# Patient Record
Sex: MALE | Race: BLACK OR AFRICAN AMERICAN | Employment: OTHER | URBAN - METROPOLITAN AREA
[De-identification: names, ages, dates, MRNs, and addresses within clinical notes are randomized per-mention and may not be internally consistent; named-entity substitution may affect disease eponyms.]

---

## 2020-01-22 ENCOUNTER — HOSPITAL ENCOUNTER (OUTPATIENT)
Dept: LAB | Age: 65
Discharge: HOME | End: 2020-01-22

## 2020-01-22 ENCOUNTER — OFFICE VISIT (OUTPATIENT)
Dept: FAMILY MEDICINE CLINIC | Age: 65
End: 2020-01-22

## 2020-01-22 VITALS
OXYGEN SATURATION: 96 % | HEIGHT: 69 IN | SYSTOLIC BLOOD PRESSURE: 140 MMHG | TEMPERATURE: 98 F | RESPIRATION RATE: 16 BRPM | DIASTOLIC BLOOD PRESSURE: 94 MMHG | HEART RATE: 75 BPM | BODY MASS INDEX: 29.47 KG/M2 | WEIGHT: 199 LBS

## 2020-01-22 DIAGNOSIS — I10 ESSENTIAL HYPERTENSION: Primary | ICD-10-CM

## 2020-01-22 DIAGNOSIS — E78.2 MIXED HYPERLIPIDEMIA: ICD-10-CM

## 2020-01-22 DIAGNOSIS — Z11.59 SCREENING FOR VIRAL DISEASE: ICD-10-CM

## 2020-01-22 DIAGNOSIS — I10 ESSENTIAL HYPERTENSION: ICD-10-CM

## 2020-01-22 LAB
ALBUMIN SERPL-MCNC: 4.1 G/DL (ref 3.5–5)
ALBUMIN/GLOB SERPL: 0.9 {RATIO} (ref 1.1–2.2)
ALP SERPL-CCNC: 81 U/L (ref 45–117)
ALT SERPL-CCNC: 29 U/L (ref 12–78)
ANION GAP SERPL CALC-SCNC: 7 MMOL/L (ref 5–15)
AST SERPL-CCNC: 22 U/L (ref 15–37)
BILIRUB SERPL-MCNC: 0.7 MG/DL (ref 0.2–1)
BUN SERPL-MCNC: 9 MG/DL (ref 6–20)
BUN/CREAT SERPL: 9 (ref 12–20)
CALCIUM SERPL-MCNC: 9.3 MG/DL (ref 8.5–10.1)
CHLORIDE SERPL-SCNC: 104 MMOL/L (ref 97–108)
CHOLEST SERPL-MCNC: 158 MG/DL
CO2 SERPL-SCNC: 27 MMOL/L (ref 21–32)
CREAT SERPL-MCNC: 1 MG/DL (ref 0.7–1.3)
GLOBULIN SER CALC-MCNC: 4.5 G/DL (ref 2–4)
GLUCOSE SERPL-MCNC: 106 MG/DL (ref 65–100)
HDLC SERPL-MCNC: 40 MG/DL
HDLC SERPL: 4 {RATIO} (ref 0–5)
LDLC SERPL CALC-MCNC: 88.2 MG/DL (ref 0–100)
LIPID PROFILE,FLP: NORMAL
POTASSIUM SERPL-SCNC: 3.2 MMOL/L (ref 3.5–5.1)
PROT SERPL-MCNC: 8.6 G/DL (ref 6.4–8.2)
SODIUM SERPL-SCNC: 138 MMOL/L (ref 136–145)
TRIGL SERPL-MCNC: 149 MG/DL (ref ?–150)
VLDLC SERPL CALC-MCNC: 29.8 MG/DL

## 2020-01-22 RX ORDER — ATORVASTATIN CALCIUM 10 MG/1
TABLET, FILM COATED ORAL DAILY
COMMUNITY
End: 2020-01-22 | Stop reason: SDUPTHER

## 2020-01-22 RX ORDER — AMLODIPINE BESYLATE 10 MG/1
TABLET ORAL DAILY
COMMUNITY
End: 2020-01-22 | Stop reason: SDUPTHER

## 2020-01-22 RX ORDER — LISINOPRIL 40 MG/1
40 TABLET ORAL DAILY
Qty: 90 TAB | Refills: 1 | Status: SHIPPED | OUTPATIENT
Start: 2020-01-22 | End: 2020-08-11

## 2020-01-22 RX ORDER — POTASSIUM CHLORIDE 20 MEQ/1
TABLET, EXTENDED RELEASE ORAL 2 TIMES DAILY
COMMUNITY
End: 2020-03-03

## 2020-01-22 RX ORDER — AMLODIPINE BESYLATE 10 MG/1
10 TABLET ORAL DAILY
Qty: 90 TAB | Refills: 1 | Status: SHIPPED | OUTPATIENT
Start: 2020-01-22 | End: 2020-08-26

## 2020-01-22 RX ORDER — ATORVASTATIN CALCIUM 10 MG/1
10 TABLET, FILM COATED ORAL DAILY
Qty: 90 TAB | Refills: 1 | Status: SHIPPED | OUTPATIENT
Start: 2020-01-22 | End: 2020-07-22

## 2020-01-22 NOTE — PROGRESS NOTES
Иван Zurita is a 71 y.o. male   Chief Complaint   Patient presents with    New Patient    Establish Care    pt here to [facility] and states he has missed a few days of BP medication. Pt does need a refill of meds and has hx of HLD and is on lipitor 10mg in the morning. Pt is fasting today and will start the lipitor at night. Pt does not check bp at home. No issues with myalgias or arthralgias. he is a 71y.o. year old male who presents for evalution. Reviewed PmHx, RxHx, FmHx, SocHx, AllgHx and updated and dated in the chart. Review of Systems - negative except as listed above in the HPI    Objective:     Vitals:    01/22/20 0922 01/22/20 0955   BP: (!) 155/104 (!) 140/94   Pulse: 75    Resp: 16    Temp: 98 °F (36.7 °C)    TempSrc: Oral    SpO2: 96%    Weight: 199 lb (90.3 kg)    Height: 5' 9\" (1.753 m)        Current Outpatient Medications   Medication Sig    potassium chloride (K-DUR, KLOR-CON) 20 mEq tablet Take  by mouth two (2) times a day.  amLODIPine (NORVASC) 10 mg tablet Take 1 Tab by mouth daily.  atorvastatin (LIPITOR) 10 mg tablet Take 1 Tab by mouth daily.  lisinopril (PRINIVIL, ZESTRIL) 40 mg tablet Take 1 Tab by mouth daily.  TRIAMTERENE-HYDROCHLOROTHIAZIDE 37.5 MG-25 MG TAB       No current facility-administered medications for this visit. Physical Examination: General appearance - alert, well appearing, and in no distress  Chest - clear to auscultation, no wheezes, rales or rhonchi, symmetric air entry  Heart - normal rate, regular rhythm, normal S1, S2, no murmurs, rubs, clicks or gallops  Abdomen - soft, nontender, nondistended, no masses or organomegaly      Assessment/ Plan:   Diagnoses and all orders for this visit:    1. Essential hypertension  -     amLODIPine (NORVASC) 10 mg tablet; Take 1 Tab by mouth daily. -     lisinopril (PRINIVIL, ZESTRIL) 40 mg tablet; Take 1 Tab by mouth daily.  -     METABOLIC PANEL, COMPREHENSIVE; Future    2.  Mixed hyperlipidemia  -     atorvastatin (LIPITOR) 10 mg tablet; Take 1 Tab by mouth daily.  -     LIPID PANEL; Future  -     METABOLIC PANEL, COMPREHENSIVE; Future    3. Screening for viral disease  -     HEPATITIS C AB; Future       Follow-up and Dispositions    · Return in about 3 months (around 4/22/2020), or if symptoms worsen or fail to improve. I have discussed the diagnosis with the patient and the intended plan as seen in the above orders. The patient has received an after-visit summary and questions were answered concerning future plans. Pt conveyed understanding of plan.     Medication Side Effects and Warnings were discussed with patient      Alla Recinos DO

## 2020-01-22 NOTE — PATIENT INSTRUCTIONS
A Healthy Lifestyle: Care Instructions Your Care Instructions A healthy lifestyle can help you feel good, stay at a healthy weight, and have plenty of energy for both work and play. A healthy lifestyle is something you can share with your whole family. A healthy lifestyle also can lower your risk for serious health problems, such as high blood pressure, heart disease, and diabetes. You can follow a few steps listed below to improve your health and the health of your family. Follow-up care is a key part of your treatment and safety. Be sure to make and go to all appointments, and call your doctor if you are having problems. It's also a good idea to know your test results and keep a list of the medicines you take. How can you care for yourself at home? · Do not eat too much sugar, fat, or fast foods. You can still have dessert and treats now and then. The goal is moderation. · Start small to improve your eating habits. Pay attention to portion sizes, drink less juice and soda pop, and eat more fruits and vegetables. ? Eat a healthy amount of food. A 3-ounce serving of meat, for example, is about the size of a deck of cards. Fill the rest of your plate with vegetables and whole grains. ? Limit the amount of soda and sports drinks you have every day. Drink more water when you are thirsty. ? Eat at least 5 servings of fruits and vegetables every day. It may seem like a lot, but it is not hard to reach this goal. A serving or helping is 1 piece of fruit, 1 cup of vegetables, or 2 cups of leafy, raw vegetables. Have an apple or some carrot sticks as an afternoon snack instead of a candy bar. Try to have fruits and/or vegetables at every meal. 
· Make exercise part of your daily routine. You may want to start with simple activities, such as walking, bicycling, or slow swimming. Try to be active 30 to 60 minutes every day.  You do not need to do all 30 to 60 minutes all at once. For example, you can exercise 3 times a day for 10 or 20 minutes. Moderate exercise is safe for most people, but it is always a good idea to talk to your doctor before starting an exercise program. 
· Keep moving. Nae Pierre the lawn, work in the garden, or [employer]. Take the stairs instead of the elevator at work. · If you smoke, quit. People who smoke have an increased risk for heart attack, stroke, cancer, and other lung illnesses. Quitting is hard, but there are ways to boost your chance of quitting tobacco for good. ? Use nicotine gum, patches, or lozenges. ? Ask your doctor about stop-smoking programs and medicines. ? Keep trying. In addition to reducing your risk of diseases in the future, you will notice some benefits soon after you stop using tobacco. If you have shortness of breath or asthma symptoms, they will likely get better within a few weeks after you quit. · Limit how much alcohol you drink. Moderate amounts of alcohol (up to 2 drinks a day for men, 1 drink a day for women) are okay. But drinking too much can lead to liver problems, high blood pressure, and other health problems. Family health If you have a family, there are many things you can do together to improve your health. · Eat meals together as a family as often as possible. · Eat healthy foods. This includes fruits, vegetables, lean meats and dairy, and whole grains. · Include your family in your fitness plan. Most people think of activities such as jogging or tennis as the way to fitness, but there are many ways you and your family can be more active. Anything that makes you breathe hard and gets your heart pumping is exercise. Here are some tips: 
? Walk to do errands or to take your child to school or the bus. 
? Go for a family bike ride after dinner instead of watching TV. Where can you learn more? Go to http://justine-natacha.info/. Enter D621 in the search box to learn more about \"A Healthy Lifestyle: Care Instructions. \" Current as of: May 28, 2019 Content Version: 12.2 © 5711-9023 [employer]. Care instructions adapted under license by [employer] (which disclaims liability or warranty for this information). If you have questions about a medical condition or this instruction, always ask your healthcare professional. [place] [address] any warranty or liability for your use of this information.

## 2020-01-22 NOTE — PROGRESS NOTES
Chief Complaint   Patient presents with    New Patient   [address]     Patient presents in office today as a NP to establish care. No concerns.     Learning Assessment 1/22/2020   PRIMARY LEARNER Patient   PRIMARY LANGUAGE ENGLISH   LEARNER PREFERENCE PRIMARY LISTENING   ANSWERED BY self   RELATIONSHIP SELF

## 2020-01-23 LAB
HCV AB SERPL QL IA: NONREACTIVE
HCV COMMENT,HCGAC: NORMAL

## 2020-01-23 NOTE — PROGRESS NOTES
Unable to add on an A1C he can stop in for another draw no appt needed. Or we can check at next visit.

## 2020-01-23 NOTE — PROGRESS NOTES
Glucose is a touch high so adding on a diabetes marker. Potassium is a little low so increase dietary potassium thru legumes, mushrooms, yogurt/dairy. Hep C negative    Cholesterol looks good.

## 2020-01-24 ENCOUNTER — TELEPHONE (OUTPATIENT)
Dept: FAMILY MEDICINE CLINIC | Age: 65
End: 2020-01-24

## 2020-01-24 NOTE — TELEPHONE ENCOUNTER
----- Message from Johnathon Plummer sent at 1/24/2020 10:20 AM EST -----  Regarding: Anai/Telephone  Patient return call    Caller's first and last name and relationship (if not the patient):      Best contact number(s): [phone]      Whose call is being returned: Karlyne Kanner      Details to clarify the request:please call patient back      Johnathon Plummer

## 2020-01-24 NOTE — PROGRESS NOTES
2nd attempt. Called and spoke with patient in reference to labs. Verbalized understanding. F/u apt scheduled for 4/22/20. He states that he will do his A1C at that time.

## 2020-03-03 RX ORDER — POTASSIUM CHLORIDE 1500 MG/1
TABLET, EXTENDED RELEASE ORAL
Qty: 30 TAB | Refills: 0 | Status: SHIPPED | OUTPATIENT
Start: 2020-03-03 | End: 2020-03-31

## 2020-03-31 RX ORDER — POTASSIUM CHLORIDE 1500 MG/1
TABLET, EXTENDED RELEASE ORAL
Qty: 30 TAB | Refills: 0 | Status: SHIPPED | OUTPATIENT
Start: 2020-03-31 | End: 2020-04-29

## 2020-04-29 RX ORDER — POTASSIUM CHLORIDE 1500 MG/1
TABLET, EXTENDED RELEASE ORAL
Qty: 30 TAB | Refills: 0 | Status: SHIPPED | OUTPATIENT
Start: 2020-04-29 | End: 2020-05-28

## 2020-05-28 RX ORDER — POTASSIUM CHLORIDE 1500 MG/1
TABLET, EXTENDED RELEASE ORAL
Qty: 30 TAB | Refills: 0 | Status: SHIPPED | OUTPATIENT
Start: 2020-05-28 | End: 2020-06-30

## 2020-06-30 RX ORDER — POTASSIUM CHLORIDE 1500 MG/1
TABLET, EXTENDED RELEASE ORAL
Qty: 30 TAB | Refills: 0 | Status: SHIPPED | OUTPATIENT
Start: 2020-06-30 | End: 2020-07-31

## 2020-07-20 DIAGNOSIS — E78.2 MIXED HYPERLIPIDEMIA: ICD-10-CM

## 2020-07-22 RX ORDER — ATORVASTATIN CALCIUM 10 MG/1
TABLET, FILM COATED ORAL
Qty: 90 TAB | Refills: 1 | Status: SHIPPED | OUTPATIENT
Start: 2020-07-22 | End: 2021-02-02

## 2020-07-31 RX ORDER — POTASSIUM CHLORIDE 1500 MG/1
TABLET, EXTENDED RELEASE ORAL
Qty: 30 TAB | Refills: 0 | Status: SHIPPED | OUTPATIENT
Start: 2020-07-31 | End: 2020-08-18 | Stop reason: SDUPTHER

## 2020-07-31 NOTE — LETTER
8/4/2020 8:40 AM 
 
Mr. Concecpion Chatman [address] [place] [address] Dear Mr. Ricouel Alu: 
 
Isai Foley missed you! We have sent a 30 day supply of your potassium supplement to pharmacy but we do need to have labs drawn,please call our office at [phone] and schedule a follow up appointment for your continued care before the next refill. Sincerely, [address] [facility] [address], DO

## 2020-08-03 NOTE — TELEPHONE ENCOUNTER
Attempt to reach pt unsuccessful. LVM for patient to [facility] to make appt for future refills.

## 2020-08-04 ENCOUNTER — TELEPHONE (OUTPATIENT)
Dept: FAMILY MEDICINE CLINIC | Age: 65
End: 2020-08-04

## 2020-08-08 DIAGNOSIS — I10 ESSENTIAL HYPERTENSION: ICD-10-CM

## 2020-08-11 RX ORDER — LISINOPRIL 40 MG/1
TABLET ORAL
Qty: 90 TAB | Refills: 1 | Status: ON HOLD | OUTPATIENT
Start: 2020-08-11 | End: 2021-02-03

## 2020-08-18 ENCOUNTER — OFFICE VISIT (OUTPATIENT)
Dept: FAMILY MEDICINE CLINIC | Age: 65
End: 2020-08-18
Payer: MEDICARE

## 2020-08-18 VITALS
RESPIRATION RATE: 18 BRPM | SYSTOLIC BLOOD PRESSURE: 152 MMHG | WEIGHT: 196 LBS | HEART RATE: 71 BPM | OXYGEN SATURATION: 97 % | BODY MASS INDEX: 29.03 KG/M2 | TEMPERATURE: 98 F | HEIGHT: 69 IN | DIASTOLIC BLOOD PRESSURE: 99 MMHG

## 2020-08-18 DIAGNOSIS — R73.01 ELEVATED FASTING GLUCOSE: ICD-10-CM

## 2020-08-18 DIAGNOSIS — I10 ESSENTIAL HYPERTENSION: ICD-10-CM

## 2020-08-18 DIAGNOSIS — Z12.11 COLON CANCER SCREENING: ICD-10-CM

## 2020-08-18 DIAGNOSIS — Z13.6 SCREENING FOR CARDIOVASCULAR CONDITION: ICD-10-CM

## 2020-08-18 DIAGNOSIS — Z12.5 SPECIAL SCREENING FOR MALIGNANT NEOPLASM OF PROSTATE: ICD-10-CM

## 2020-08-18 DIAGNOSIS — E78.2 MIXED HYPERLIPIDEMIA: Primary | ICD-10-CM

## 2020-08-18 DIAGNOSIS — Z00.00 MEDICARE ANNUAL WELLNESS VISIT, SUBSEQUENT: ICD-10-CM

## 2020-08-18 PROCEDURE — G0439 PPPS, SUBSEQ VISIT: HCPCS | Performed by: FAMILY MEDICINE

## 2020-08-18 PROCEDURE — G8419 CALC BMI OUT NRM PARAM NOF/U: HCPCS | Performed by: FAMILY MEDICINE

## 2020-08-18 PROCEDURE — G8536 NO DOC ELDER MAL SCRN: HCPCS | Performed by: FAMILY MEDICINE

## 2020-08-18 PROCEDURE — 99214 OFFICE O/P EST MOD 30 MIN: CPT | Performed by: FAMILY MEDICINE

## 2020-08-18 PROCEDURE — 1101F PT FALLS ASSESS-DOCD LE1/YR: CPT | Performed by: FAMILY MEDICINE

## 2020-08-18 PROCEDURE — G8510 SCR DEP NEG, NO PLAN REQD: HCPCS | Performed by: FAMILY MEDICINE

## 2020-08-18 PROCEDURE — G8427 DOCREV CUR MEDS BY ELIG CLIN: HCPCS | Performed by: FAMILY MEDICINE

## 2020-08-18 PROCEDURE — G8753 SYS BP > OR = 140: HCPCS | Performed by: FAMILY MEDICINE

## 2020-08-18 PROCEDURE — G8755 DIAS BP > OR = 90: HCPCS | Performed by: FAMILY MEDICINE

## 2020-08-18 PROCEDURE — G0444 DEPRESSION SCREEN ANNUAL: HCPCS | Performed by: FAMILY MEDICINE

## 2020-08-18 PROCEDURE — 3017F COLORECTAL CA SCREEN DOC REV: CPT | Performed by: FAMILY MEDICINE

## 2020-08-18 RX ORDER — POTASSIUM CHLORIDE 20 MEQ/1
TABLET, EXTENDED RELEASE ORAL
Qty: 30 TAB | Refills: 11 | Status: SHIPPED | OUTPATIENT
Start: 2020-08-18 | End: 2021-10-26

## 2020-08-18 NOTE — PROGRESS NOTES
Progress Note    he is a 76y.o. year old male who presents for evalution. Subjective:     Pt here for follow up on lipitor and tolerates this fine. Last LDL was at goal, no hx of CVD. Pt on lisinopril 40 and took that this AM.  Pt is fasting. Prev glucose was a little elevated at 106. Denies any hx of prediabetes or DM. Pt does exercise states walks 1 mile per day. Pt is down 3 lbs from last visit and attributes this to walking. Follows no specific diet. Patient is a smoker and is 76years old has never had a AAA screen will order. Reviewed PmHx, RxHx, FmHx, SocHx, AllgHx and updated and dated in the chart. Review of Systems - negative except as listed above in the HPI    Objective:     Vitals:    08/18/20 0829 08/18/20 0913   BP: 143/90 (!) 152/99   Pulse: 71    Resp: 18    Temp: 98 °F (36.7 °C)    TempSrc: Oral    SpO2: 97%    Weight: 196 lb (88.9 kg)    Height: 5' 9\" (1.753 m)        Current Outpatient Medications   Medication Sig    potassium chloride (Klor-Con M20) 20 mEq tablet TAKE 1 TABLET BY MOUTH EVERY DAY    lisinopriL (PRINIVIL, ZESTRIL) 40 mg tablet TAKE 1 TABLET BY MOUTH EVERY DAY    atorvastatin (LIPITOR) 10 mg tablet TAKE 1 TABLET BY MOUTH EVERY DAY    amLODIPine (NORVASC) 10 mg tablet Take 1 Tab by mouth daily.  TRIAMTERENE-HYDROCHLOROTHIAZIDE 37.5 MG-25 MG TAB       No current facility-administered medications for this visit. Physical Examination: General appearance - alert, well appearing, and in no distress  Mental status - alert, oriented to person, place, and time  Chest - clear to auscultation, no wheezes, rales or rhonchi, symmetric air entry  Heart - normal rate, regular rhythm, normal S1, S2, no murmurs, rubs, clicks or gallops  Extremities - peripheral pulses normal, no pedal edema, no clubbing or cyanosis      Assessment/ Plan:   Diagnoses and all orders for this visit:    1.  Mixed hyperlipidemia  -     LIPID PANEL  -     METABOLIC PANEL, COMPREHENSIVE    2. Medicare annual wellness visit, subsequent  -     CBC WITH AUTOMATED DIFF    3. Special screening for malignant neoplasm of prostate  -     PSA SCREENING (SCREENING)    4. Essential hypertension  -     METABOLIC PANEL, COMPREHENSIVE  Patient advised to follow-up in 4 weeks for blood pressure recheck. Lifestyle changes  5. Elevated fasting glucose  -     HEMOGLOBIN A1C WITH EAG    6. Colon cancer screening  -     COLOGUARD TEST (FECAL DNA COLORECTAL CANCER SCREENING)    7. Screening for cardiovascular condition  -     US EXAM SCREENING AAA; Future    Other orders  -     potassium chloride (Klor-Con M20) 20 mEq tablet; TAKE 1 TABLET BY MOUTH EVERY DAY       Follow-up and Dispositions    · Return in about 4 weeks (around 9/15/2020), or if symptoms worsen or fail to improve, for Blood pressure check. I have discussed the diagnosis with the patient and the intended plan as seen in the above orders. The patient has received an after-visit summary and questions were answered concerning future plans. Pt conveyed understanding of plan. Medication Side Effects and Warnings were discussed with patient      Ronni Kapoor, DO        This is an Initial Medicare Annual Wellness Exam (AWV) (Performed 12 months after IPPE or effective date of Medicare Part B enrollment, Once in a lifetime)    I have reviewed the patient's medical history in detail and updated the computerized patient record. History     Patient Active Problem List   Diagnosis Code    HTN (hypertension) I10     Past Medical History:   Diagnosis Date    Essential hypertension     Hypercholesterolemia       History reviewed. No pertinent surgical history.   Current Outpatient Medications   Medication Sig Dispense Refill    potassium chloride (Klor-Con M20) 20 mEq tablet TAKE 1 TABLET BY MOUTH EVERY DAY 30 Tab 11    lisinopriL (PRINIVIL, ZESTRIL) 40 mg tablet TAKE 1 TABLET BY MOUTH EVERY DAY 90 Tab 1    atorvastatin (LIPITOR) 10 mg tablet TAKE 1 TABLET BY MOUTH EVERY DAY 90 Tab 1    amLODIPine (NORVASC) 10 mg tablet Take 1 Tab by mouth daily. 90 Tab 1    TRIAMTERENE-HYDROCHLOROTHIAZIDE 37.5 MG-25 MG TAB         No Known Allergies    Family History   Problem Relation Age of Onset    Diabetes Mother      Social History     Tobacco Use    Smoking status: Current Every Day Smoker     Packs/day: 0.25     Years: 30.00     Pack years: 7.50     Types: Cigarettes    Smokeless tobacco: Never Used   Substance Use Topics    Alcohol use: Yes     Comment: socially        Depression Risk Factor Screening:     3 most recent PHQ Screens 8/18/2020   Little interest or pleasure in doing things Not at all   Feeling down, depressed, irritable, or hopeless Not at all   Total Score PHQ 2 0       Alcohol Risk Factor Screening (MALE > 65): Do you average more 1 drink per night or more than 7 drinks a week: No    In the past three months have you have had more than 4 drinks containing alcohol on one occasion: No      Functional Ability and Level of Safety:   Hearing: Hearing is good. Activities of Daily Living: The home contains: no safety equipment. Patient does total self care    Ambulation: with no difficulty      Fall Risk:  Fall Risk Assessment, last 12 mths 8/18/2020   Able to walk? Yes   Fall in past 12 months? No     Abuse Screen:  Patient is not abused       Cognitive Screening   Has your family/caregiver stated any concerns about your memory: no         Patient Care Team   Patient Care Team:  Lauro Hernandez DO as PCP - General (Family Medicine)  Lauro Hernandez DO as PCP - [facility] Empaneled Provider    Assessment/Plan   Education and counseling provided:  Are appropriate based on today's review and evaluation  Prostate cancer screening tests (PSA, covered annually)    Diagnoses and all orders for this visit:    1. Mixed hyperlipidemia  -     LIPID PANEL  -     METABOLIC PANEL, COMPREHENSIVE    2.  Medicare annual wellness visit, subsequent  - CBC WITH AUTOMATED DIFF    3. Special screening for malignant neoplasm of prostate  -     PSA SCREENING (SCREENING)    4. Essential hypertension  -     METABOLIC PANEL, COMPREHENSIVE    5. Elevated fasting glucose  -     HEMOGLOBIN A1C WITH EAG    6. Colon cancer screening  -     COLOGUARD TEST (FECAL DNA COLORECTAL CANCER SCREENING)    7. Screening for cardiovascular condition  -     US EXAM SCREENING AAA; Future    Other orders  -     potassium chloride (Klor-Con M20) 20 mEq tablet; TAKE 1 TABLET BY MOUTH EVERY DAY         Health Maintenance Due   Topic Date Due    DTaP/Tdap/Td series (1 - Tdap) 06/01/1975    Cologuard Q3Y  06/01/2004    Shingrix Vaccine Age 50> (1 of 2) 06/01/2004    GLAUCOMA SCREENING Q2Y  06/01/2019    AAA Screening 73-67 YO Male Smoking Patients  06/01/2019    Pneumococcal 65+ years (1 of 1 - PPSV23) 06/01/2019    Medicare Yearly Exam  01/22/2020    Influenza Age 9 to Adult  08/01/2020   I have discussed the diagnosis with the patient and the intended plan as seen in the above orders. The patient has received an after-visit summary and questions were answered concerning future plans. Pt conveyed understanding of plan.       Dr Aaliyah Carrasco

## 2020-08-18 NOTE — PROGRESS NOTES
Chief Complaint   Patient presents with    Follow-up     Patient presents in office today for f/u and labs. No concerns. 1. Have you been to the ER, urgent care clinic since your last visit? Hospitalized since your last visit? No    2. Have you seen or consulted any other health care providers outside of the [address] [place] [address] since your last visit? Include any pap smears or colon screening.  No    Learning Assessment 1/22/2020   PRIMARY LEARNER Patient   PRIMARY LANGUAGE ENGLISH   LEARNER PREFERENCE PRIMARY LISTENING   ANSWERED BY self   RELATIONSHIP SELF

## 2020-08-18 NOTE — PATIENT INSTRUCTIONS
Medicare Wellness Visit, Male The best way to live healthy is to have a lifestyle where you eat a well-balanced diet, exercise regularly, limit alcohol use, and quit all forms of tobacco/nicotine, if applicable. Regular preventive services are another way to keep healthy. Preventive services (vaccines, screening tests, monitoring & exams) can help personalize your care plan, which helps you manage your own care. Screening tests can find health problems at the earliest stages, when they are easiest to treat. [facility] follows the current, evidence-based guidelines published by the [facility] ([facility] [[facility]]STF) when recommending preventive services for our patients. Because we follow these guidelines, sometimes recommendations change over time as research supports it. (For example, a prostate screening blood test is no longer routinely recommended for men with no symptoms). Of course, you and your doctor may decide to screen more often for some diseases, based on your risk and co-morbidities (chronic disease you are already diagnosed with). Preventive services for you include: - Medicare offers their members a free annual wellness visit, which is time for you and your primary care provider to discuss and plan for your preventive service needs. Take advantage of this benefit every year! 
-All adults over age 72 should receive the recommended pneumonia vaccines. Current USPSTF guidelines recommend a series of two vaccines for the best pneumonia protection.  
-All adults should have a flu vaccine yearly and tetanus vaccine every 10 years. 
-All adults age 48 and older should receive the shingles vaccines (series of two vaccines).       
-All adults age 38-68 who are overweight should have a diabetes screening test once every three years.  
-Other screening tests & preventive services for persons with diabetes include: an eye exam to screen for diabetic retinopathy, a kidney function test, a foot exam, and stricter control over your cholesterol.  
-Cardiovascular screening for adults with routine risk involves an electrocardiogram (ECG) at intervals determined by the provider.  
-Colorectal cancer screening should be done for adults age 54-65 with no increased risk factors for colorectal cancer. There are a number of acceptable methods of screening for this type of cancer. Each test has its own benefits and drawbacks. Discuss with your provider what is most appropriate for you during your annual wellness visit. The different tests include: colonoscopy (considered the best screening method), a fecal occult blood test, a fecal DNA test, and sigmoidoscopy. 
-All adults born between [facility] should be screened once for Hepatitis C. 
-An Abdominal Aortic Aneurysm (AAA) Screening is recommended for men age 73-68 who has ever smoked in their lifetime. Here is a list of your current Health Maintenance items (your personalized list of preventive services) with a due date: 
Health Maintenance Due Topic Date Due  
 DTaP/Tdap/Td  (1 - Tdap) 06/01/1975  Shingles Vaccine (1 of 2) 06/01/2004  Colon Cancer Stool Test  06/01/2004  Glaucoma Screening   06/01/2019  AAA Screening  06/01/2019  Pneumococcal Vaccine (1 of 1 - PPSV23) 06/01/2019 Ivanna Brunson Annual Well Visit  01/22/2020  Flu Vaccine  08/01/2020

## 2020-08-19 LAB
ALBUMIN SERPL-MCNC: 4.5 G/DL (ref 3.8–4.8)
ALBUMIN/GLOB SERPL: 1.4 {RATIO} (ref 1.2–2.2)
ALP SERPL-CCNC: 88 IU/L (ref 39–117)
ALT SERPL-CCNC: 16 IU/L (ref 0–44)
AST SERPL-CCNC: 19 IU/L (ref 0–40)
BASOPHILS # BLD AUTO: 0.1 X10E3/UL (ref 0–0.2)
BASOPHILS NFR BLD AUTO: 1 %
BILIRUB SERPL-MCNC: 0.5 MG/DL (ref 0–1.2)
BUN SERPL-MCNC: 9 MG/DL (ref 8–27)
BUN/CREAT SERPL: 9 (ref 10–24)
CALCIUM SERPL-MCNC: 9.7 MG/DL (ref 8.6–10.2)
CHLORIDE SERPL-SCNC: 100 MMOL/L (ref 96–106)
CHOLEST SERPL-MCNC: 133 MG/DL (ref 100–199)
CO2 SERPL-SCNC: 23 MMOL/L (ref 20–29)
CREAT SERPL-MCNC: 0.98 MG/DL (ref 0.76–1.27)
EOSINOPHIL # BLD AUTO: 0.3 X10E3/UL (ref 0–0.4)
EOSINOPHIL NFR BLD AUTO: 5 %
ERYTHROCYTE [DISTWIDTH] IN BLOOD BY AUTOMATED COUNT: 11.8 % (ref 11.6–15.4)
EST. AVERAGE GLUCOSE BLD GHB EST-MCNC: 117 MG/DL
GLOBULIN SER CALC-MCNC: 3.3 G/DL (ref 1.5–4.5)
GLUCOSE SERPL-MCNC: 104 MG/DL (ref 65–99)
HBA1C MFR BLD: 5.7 % (ref 4.8–5.6)
HCT VFR BLD AUTO: 42.8 % (ref 37.5–51)
HDLC SERPL-MCNC: 37 MG/DL
HGB BLD-MCNC: 14.5 G/DL (ref 13–17.7)
IMM GRANULOCYTES # BLD AUTO: 0 X10E3/UL (ref 0–0.1)
IMM GRANULOCYTES NFR BLD AUTO: 0 %
INTERPRETATION, 910389: NORMAL
LDLC SERPL CALC-MCNC: 73 MG/DL (ref 0–99)
LYMPHOCYTES # BLD AUTO: 2.8 X10E3/UL (ref 0.7–3.1)
LYMPHOCYTES NFR BLD AUTO: 44 %
MCH RBC QN AUTO: 30 PG (ref 26.6–33)
MCHC RBC AUTO-ENTMCNC: 33.9 G/DL (ref 31.5–35.7)
MCV RBC AUTO: 88 FL (ref 79–97)
MONOCYTES # BLD AUTO: 0.4 X10E3/UL (ref 0.1–0.9)
MONOCYTES NFR BLD AUTO: 6 %
NEUTROPHILS # BLD AUTO: 2.8 X10E3/UL (ref 1.4–7)
NEUTROPHILS NFR BLD AUTO: 44 %
PLATELET # BLD AUTO: 265 X10E3/UL (ref 150–450)
POTASSIUM SERPL-SCNC: 3.7 MMOL/L (ref 3.5–5.2)
PROT SERPL-MCNC: 7.8 G/DL (ref 6–8.5)
PSA SERPL-MCNC: 0.7 NG/ML (ref 0–4)
RBC # BLD AUTO: 4.84 X10E6/UL (ref 4.14–5.8)
SODIUM SERPL-SCNC: 137 MMOL/L (ref 134–144)
TRIGL SERPL-MCNC: 113 MG/DL (ref 0–149)
VLDLC SERPL CALC-MCNC: 23 MG/DL (ref 5–40)
WBC # BLD AUTO: 6.3 X10E3/UL (ref 3.4–10.8)

## 2020-08-19 NOTE — PROGRESS NOTES
Patient was contacted he verified his , I informed him of his labs and when we would like him to come back in for lab recheck and bp recheck. Patient verbalized he understood.

## 2020-08-19 NOTE — PROGRESS NOTES
Patient is prediabetic with a hemoglobin A1c of 5.7 which is borderline prediabetes. Glucose is a touch elevated as well. Patient decrease carbohydrates and sugars increased exercise including aerobic exercise. Would want to recheck this in 6 months. Prostate cancer screening is normal.    Cholesterol looks good except HDL is slightly low at 37 and increased exercise can help to bring this up. Patient to follow-up in 4 weeks for blood pressure check.

## 2020-08-21 DIAGNOSIS — Z13.6 SCREENING FOR CARDIOVASCULAR CONDITION: ICD-10-CM

## 2020-08-26 ENCOUNTER — HOSPITAL ENCOUNTER (OUTPATIENT)
Dept: ULTRASOUND IMAGING | Age: 65
Discharge: HOME | End: 2020-08-26
Attending: FAMILY MEDICINE
Payer: MEDICARE

## 2020-08-26 ENCOUNTER — DOCUMENTATION ONLY (OUTPATIENT)
Dept: FAMILY MEDICINE CLINIC | Age: 65
End: 2020-08-26

## 2020-08-26 DIAGNOSIS — I10 ESSENTIAL HYPERTENSION: ICD-10-CM

## 2020-08-26 PROCEDURE — 76706 US ABDL AORTA SCREEN AAA: CPT

## 2020-08-26 RX ORDER — AMLODIPINE BESYLATE 10 MG/1
TABLET ORAL
Qty: 90 TAB | Refills: 1 | Status: SHIPPED | OUTPATIENT
Start: 2020-08-26 | End: 2021-03-12

## 2020-09-09 ENCOUNTER — OFFICE VISIT (OUTPATIENT)
Dept: FAMILY MEDICINE CLINIC | Age: 65
End: 2020-09-09
Payer: MEDICARE

## 2020-09-09 VITALS
HEART RATE: 65 BPM | HEIGHT: 69 IN | RESPIRATION RATE: 16 BRPM | DIASTOLIC BLOOD PRESSURE: 89 MMHG | SYSTOLIC BLOOD PRESSURE: 138 MMHG | BODY MASS INDEX: 29.33 KG/M2 | TEMPERATURE: 98.2 F | WEIGHT: 198 LBS | OXYGEN SATURATION: 98 %

## 2020-09-09 DIAGNOSIS — I10 ESSENTIAL HYPERTENSION: Primary | ICD-10-CM

## 2020-09-09 PROCEDURE — G8510 SCR DEP NEG, NO PLAN REQD: HCPCS | Performed by: FAMILY MEDICINE

## 2020-09-09 PROCEDURE — G8427 DOCREV CUR MEDS BY ELIG CLIN: HCPCS | Performed by: FAMILY MEDICINE

## 2020-09-09 PROCEDURE — G8536 NO DOC ELDER MAL SCRN: HCPCS | Performed by: FAMILY MEDICINE

## 2020-09-09 PROCEDURE — G8419 CALC BMI OUT NRM PARAM NOF/U: HCPCS | Performed by: FAMILY MEDICINE

## 2020-09-09 PROCEDURE — G8754 DIAS BP LESS 90: HCPCS | Performed by: FAMILY MEDICINE

## 2020-09-09 PROCEDURE — 3017F COLORECTAL CA SCREEN DOC REV: CPT | Performed by: FAMILY MEDICINE

## 2020-09-09 PROCEDURE — 1101F PT FALLS ASSESS-DOCD LE1/YR: CPT | Performed by: FAMILY MEDICINE

## 2020-09-09 PROCEDURE — 99213 OFFICE O/P EST LOW 20 MIN: CPT | Performed by: FAMILY MEDICINE

## 2020-09-09 PROCEDURE — G8752 SYS BP LESS 140: HCPCS | Performed by: FAMILY MEDICINE

## 2020-09-09 NOTE — PROGRESS NOTES
Chief Complaint   Patient presents with    Blood Pressure Check     Patient presents in office today for BP check. No concerns. 1. Have you been to the ER, urgent care clinic since your last visit? Hospitalized since your last visit? No    2. Have you seen or consulted any other health care providers outside of the [address] [place] [address] since your last visit? Include any pap smears or colon screening.  No     Learning Assessment 1/22/2020   PRIMARY LEARNER Patient   PRIMARY LANGUAGE ENGLISH   LEARNER PREFERENCE PRIMARY LISTENING   ANSWERED BY self   RELATIONSHIP SELF

## 2020-09-09 NOTE — PROGRESS NOTES
Progress Note    he is a 76y.o. year old male who presents for evalution. Subjective:     Pt here for BP follow up and states has not deyanira walking as much states has been busy working on a project. On lisinopril 40 and norvasc. Does drink etoh socially. Feels fine otherwise. Reviewed PmHx, RxHx, FmHx, SocHx, AllgHx and updated and dated in the chart. Review of Systems - negative except as listed above in the HPI    Objective:     Vitals:    09/09/20 0853   BP: 138/89   Pulse: 65   Resp: 16   Temp: 98.2 °F (36.8 °C)   TempSrc: Oral   SpO2: 98%   Weight: 198 lb (89.8 kg)   Height: 5' 9\" (1.753 m)       Current Outpatient Medications   Medication Sig    amLODIPine (NORVASC) 10 mg tablet TAKE 1 TABLET BY MOUTH EVERY DAY    potassium chloride (Klor-Con M20) 20 mEq tablet TAKE 1 TABLET BY MOUTH EVERY DAY    lisinopriL (PRINIVIL, ZESTRIL) 40 mg tablet TAKE 1 TABLET BY MOUTH EVERY DAY    atorvastatin (LIPITOR) 10 mg tablet TAKE 1 TABLET BY MOUTH EVERY DAY    TRIAMTERENE-HYDROCHLOROTHIAZIDE 37.5 MG-25 MG TAB       No current facility-administered medications for this visit. Physical Examination: General appearance - alert, well appearing, and in no distress  Chest - clear to auscultation, no wheezes, rales or rhonchi, symmetric air entry  Heart - normal rate, regular rhythm, normal S1, S2, no murmurs, rubs, clicks or gallops      Assessment/ Plan:   Diagnoses and all orders for this visit:    1. Essential hypertension    On max dose of lisinopril and amlodipine. Discussed lifestyle changes and patient would like to take 3 months to work on lifestyle changes before addition of any further medications. Follow-up and Dispositions    · Return if symptoms worsen or fail to improve. I have discussed the diagnosis with the patient and the intended plan as seen in the above orders. The patient has received an after-visit summary and questions were answered concerning future plans.  Pt conveyed understanding of plan.     Medication Side Effects and Warnings were discussed with patient      Megan Medina,

## 2020-09-09 NOTE — PATIENT INSTRUCTIONS
DASH Diet: Care Instructions Your Care Instructions The DASH diet is an eating plan that can help lower your blood pressure. DASH stands for Dietary Approaches to Stop Hypertension. Hypertension is high blood pressure. The DASH diet focuses on eating foods that are high in calcium, potassium, and magnesium. These nutrients can lower blood pressure. The foods that are highest in these nutrients are fruits, vegetables, low-fat dairy products, nuts, seeds, and legumes. But taking calcium, potassium, and magnesium supplements instead of eating foods that are high in those nutrients does not have the same effect. The DASH diet also includes whole grains, fish, and poultry. The DASH diet is one of several lifestyle changes your doctor may recommend to lower your high blood pressure. Your doctor may also want you to decrease the amount of sodium in your diet. Lowering sodium while following the DASH diet can lower blood pressure even further than just the DASH diet alone. Follow-up care is a key part of your treatment and safety. Be sure to make and go to all appointments, and call your doctor if you are having problems. It's also a good idea to know your test results and keep a list of the medicines you take. How can you care for yourself at home? Following the DASH diet · Eat 4 to 5 servings of fruit each day. A serving is 1 medium-sized piece of fruit, ½ cup chopped or canned fruit, 1/4 cup dried fruit, or 4 ounces (½ cup) of fruit juice. Choose fruit more often than fruit juice. · Eat 4 to 5 servings of vegetables each day. A serving is 1 cup of lettuce or raw leafy vegetables, ½ cup of chopped or cooked vegetables, or 4 ounces (½ cup) of vegetable juice. Choose vegetables more often than vegetable juice. · Get 2 to 3 servings of low-fat and fat-free dairy each day. A serving is 8 ounces of milk, 1 cup of yogurt, or 1 ½ ounces of cheese. · Eat 6 to 8 servings of grains each day. A serving is 1 slice of bread, 1 ounce of dry cereal, or ½ cup of cooked rice, pasta, or cooked cereal. Try to choose whole-grain products as much as possible. · Limit lean meat, poultry, and fish to 2 servings each day. A serving is 3 ounces, about the size of a deck of cards. · Eat 4 to 5 servings of nuts, seeds, and legumes (cooked dried beans, lentils, and split peas) each week. A serving is 1/3 cup of nuts, 2 tablespoons of seeds, or ½ cup of cooked beans or peas. · Limit fats and oils to 2 to 3 servings each day. A serving is 1 teaspoon of vegetable oil or 2 tablespoons of salad dressing. · Limit sweets and added sugars to 5 servings or less a week. A serving is 1 tablespoon jelly or jam, ½ cup sorbet, or 1 cup of lemonade. · Eat less than 2,300 milligrams (mg) of sodium a day. If you limit your sodium to 1,500 mg a day, you can lower your blood pressure even more. Tips for success · Start small. Do not try to make dramatic changes to your diet all at once. You might feel that you are missing out on your favorite foods and then be more likely to not follow the plan. Make small changes, and stick with them. Once those changes become habit, add a few more changes. · Try some of the following: ? Make it a goal to eat a fruit or vegetable at every meal and at snacks. This will make it easy to get the recommended amount of fruits and vegetables each day. ? Try yogurt topped with fruit and nuts for a snack or healthy dessert. ? Add lettuce, tomato, cucumber, and onion to sandwiches. ? Combine a ready-made pizza crust with low-fat mozzarella cheese and lots of vegetable toppings. Try using tomatoes, squash, spinach, broccoli, carrots, cauliflower, and onions. ? Have a variety of cut-up vegetables with a low-fat dip as an appetizer instead of chips and dip. ? Sprinkle sunflower seeds or chopped almonds over salads.  Or try adding chopped walnuts or almonds to cooked vegetables. ? Try some vegetarian meals using beans and peas. Add garbanzo or kidney beans to salads. Make burritos and tacos with mashed grant beans or black beans. Where can you learn more? Go to http://justine-natacha.info/ Enter W136 in the search box to learn more about \"DASH Diet: Care Instructions. \" Current as of: December 16, 2019               Content Version: 12.6 © 9086-2997 [employer]. Care instructions adapted under license by [employer] (which disclaims liability or warranty for this information). If you have questions about a medical condition or this instruction, always ask your healthcare professional. [address] any warranty or liability for your use of this information.

## 2020-09-29 DIAGNOSIS — R52 PAIN: Primary | ICD-10-CM

## 2021-02-02 ENCOUNTER — HOSPITAL ENCOUNTER (INPATIENT)
Age: 66
LOS: 3 days | Discharge: HOME | DRG: 872 | End: 2021-02-05
Attending: STUDENT IN AN ORGANIZED HEALTH CARE EDUCATION/TRAINING PROGRAM | Admitting: FAMILY MEDICINE
Payer: MEDICARE

## 2021-02-02 ENCOUNTER — APPOINTMENT (OUTPATIENT)
Dept: CT IMAGING | Age: 66
DRG: 872 | End: 2021-02-02
Attending: STUDENT IN AN ORGANIZED HEALTH CARE EDUCATION/TRAINING PROGRAM
Payer: MEDICARE

## 2021-02-02 ENCOUNTER — APPOINTMENT (OUTPATIENT)
Dept: ULTRASOUND IMAGING | Age: 66
DRG: 872 | End: 2021-02-02
Attending: STUDENT IN AN ORGANIZED HEALTH CARE EDUCATION/TRAINING PROGRAM
Payer: MEDICARE

## 2021-02-02 DIAGNOSIS — A41.9 SEPSIS WITHOUT ACUTE ORGAN DYSFUNCTION, DUE TO UNSPECIFIED ORGANISM (HCC): ICD-10-CM

## 2021-02-02 DIAGNOSIS — E78.5 HYPERLIPIDEMIA, UNSPECIFIED HYPERLIPIDEMIA TYPE: ICD-10-CM

## 2021-02-02 DIAGNOSIS — N39.0 COMPLICATED UTI (URINARY TRACT INFECTION): ICD-10-CM

## 2021-02-02 DIAGNOSIS — I10 ESSENTIAL HYPERTENSION: ICD-10-CM

## 2021-02-02 DIAGNOSIS — N30.00 ACUTE CYSTITIS WITHOUT HEMATURIA: ICD-10-CM

## 2021-02-02 DIAGNOSIS — R05.9 COUGH: ICD-10-CM

## 2021-02-02 DIAGNOSIS — R73.03 PREDIABETES: ICD-10-CM

## 2021-02-02 DIAGNOSIS — N45.1 EPIDIDYMITIS: Primary | ICD-10-CM

## 2021-02-02 LAB
ALBUMIN SERPL-MCNC: 3.2 G/DL (ref 3.5–5)
ALBUMIN/GLOB SERPL: 0.6 {RATIO} (ref 1.1–2.2)
ALP SERPL-CCNC: 102 U/L (ref 45–117)
ALT SERPL-CCNC: 13 U/L (ref 12–78)
ANION GAP SERPL CALC-SCNC: 8 MMOL/L (ref 5–15)
APPEARANCE UR: ABNORMAL
AST SERPL-CCNC: 9 U/L (ref 15–37)
BACTERIA URNS QL MICRO: ABNORMAL /HPF
BASOPHILS # BLD: 0 K/UL (ref 0–0.1)
BASOPHILS NFR BLD: 0 % (ref 0–1)
BILIRUB SERPL-MCNC: 0.9 MG/DL (ref 0.2–1)
BILIRUB UR QL CFM: NEGATIVE
BUN SERPL-MCNC: 14 MG/DL (ref 6–20)
BUN/CREAT SERPL: 11 (ref 12–20)
CALCIUM SERPL-MCNC: 9.2 MG/DL (ref 8.5–10.1)
CHLORIDE SERPL-SCNC: 103 MMOL/L (ref 97–108)
CO2 SERPL-SCNC: 25 MMOL/L (ref 21–32)
COLOR UR: ABNORMAL
COVID-19 RAPID TEST, COVR: NOT DETECTED
CREAT SERPL-MCNC: 1.22 MG/DL (ref 0.7–1.3)
DIFFERENTIAL METHOD BLD: ABNORMAL
EOSINOPHIL # BLD: 0 K/UL (ref 0–0.4)
EOSINOPHIL NFR BLD: 0 % (ref 0–7)
EPITH CASTS URNS QL MICRO: ABNORMAL /LPF
ERYTHROCYTE [DISTWIDTH] IN BLOOD BY AUTOMATED COUNT: 12.6 % (ref 11.5–14.5)
GLOBULIN SER CALC-MCNC: 5.4 G/DL (ref 2–4)
GLUCOSE SERPL-MCNC: 122 MG/DL (ref 65–100)
GLUCOSE UR STRIP.AUTO-MCNC: NEGATIVE MG/DL
HCT VFR BLD AUTO: 39.5 % (ref 36.6–50.3)
HGB BLD-MCNC: 13.4 G/DL (ref 12.1–17)
HGB UR QL STRIP: ABNORMAL
HYALINE CASTS URNS QL MICRO: ABNORMAL /LPF (ref 0–5)
IMM GRANULOCYTES # BLD AUTO: 0.4 K/UL (ref 0–0.04)
IMM GRANULOCYTES NFR BLD AUTO: 1 % (ref 0–0.5)
KETONES UR QL STRIP.AUTO: ABNORMAL MG/DL
LACTATE SERPL-SCNC: 1.3 MMOL/L (ref 0.4–2)
LEUKOCYTE ESTERASE UR QL STRIP.AUTO: ABNORMAL
LIPASE SERPL-CCNC: 83 U/L (ref 73–393)
LYMPHOCYTES # BLD: 2 K/UL (ref 0.8–3.5)
LYMPHOCYTES NFR BLD: 5 % (ref 12–49)
MCH RBC QN AUTO: 30 PG (ref 26–34)
MCHC RBC AUTO-ENTMCNC: 33.9 G/DL (ref 30–36.5)
MCV RBC AUTO: 88.6 FL (ref 80–99)
MONOCYTES # BLD: 1.6 K/UL (ref 0–1)
MONOCYTES NFR BLD: 4 % (ref 5–13)
NEUTS SEG # BLD: 36.2 K/UL (ref 1.8–8)
NEUTS SEG NFR BLD: 90 % (ref 32–75)
NITRITE UR QL STRIP.AUTO: NEGATIVE
NRBC # BLD: 0 K/UL (ref 0–0.01)
NRBC BLD-RTO: 0 PER 100 WBC
PH UR STRIP: 5.5 [PH] (ref 5–8)
PLATELET # BLD AUTO: 370 K/UL (ref 150–400)
PMV BLD AUTO: 9.9 FL (ref 8.9–12.9)
POTASSIUM SERPL-SCNC: 3.5 MMOL/L (ref 3.5–5.1)
PROT SERPL-MCNC: 8.6 G/DL (ref 6.4–8.2)
PROT UR STRIP-MCNC: 100 MG/DL
RBC # BLD AUTO: 4.46 M/UL (ref 4.1–5.7)
RBC #/AREA URNS HPF: ABNORMAL /HPF (ref 0–5)
RBC MORPH BLD: ABNORMAL
SARS-COV-2, COV2: NORMAL
SODIUM SERPL-SCNC: 136 MMOL/L (ref 136–145)
SOURCE, COVRS: NORMAL
SP GR UR REFRACTOMETRY: 1.02 (ref 1–1.03)
UROBILINOGEN UR QL STRIP.AUTO: 1 EU/DL (ref 0.2–1)
WBC # BLD AUTO: 40.2 K/UL (ref 4.1–11.1)
WBC URNS QL MICRO: >100 /HPF (ref 0–4)

## 2021-02-02 PROCEDURE — 87491 CHLMYD TRACH DNA AMP PROBE: CPT

## 2021-02-02 PROCEDURE — 65660000000 HC RM CCU STEPDOWN

## 2021-02-02 PROCEDURE — 74011250637 HC RX REV CODE- 250/637: Performed by: STUDENT IN AN ORGANIZED HEALTH CARE EDUCATION/TRAINING PROGRAM

## 2021-02-02 PROCEDURE — 81001 URINALYSIS AUTO W/SCOPE: CPT

## 2021-02-02 PROCEDURE — 36415 COLL VENOUS BLD VENIPUNCTURE: CPT

## 2021-02-02 PROCEDURE — 87186 SC STD MICRODIL/AGAR DIL: CPT

## 2021-02-02 PROCEDURE — 83605 ASSAY OF LACTIC ACID: CPT

## 2021-02-02 PROCEDURE — 87077 CULTURE AEROBIC IDENTIFY: CPT

## 2021-02-02 PROCEDURE — 87040 BLOOD CULTURE FOR BACTERIA: CPT

## 2021-02-02 PROCEDURE — 96374 THER/PROPH/DIAG INJ IV PUSH: CPT

## 2021-02-02 PROCEDURE — 74011250636 HC RX REV CODE- 250/636: Performed by: STUDENT IN AN ORGANIZED HEALTH CARE EDUCATION/TRAINING PROGRAM

## 2021-02-02 PROCEDURE — 83690 ASSAY OF LIPASE: CPT

## 2021-02-02 PROCEDURE — 87086 URINE CULTURE/COLONY COUNT: CPT

## 2021-02-02 PROCEDURE — 96365 THER/PROPH/DIAG IV INF INIT: CPT

## 2021-02-02 PROCEDURE — 85025 COMPLETE CBC W/AUTO DIFF WBC: CPT

## 2021-02-02 PROCEDURE — 74011000258 HC RX REV CODE- 258: Performed by: EMERGENCY MEDICINE

## 2021-02-02 PROCEDURE — 74011000636 HC RX REV CODE- 636: Performed by: RADIOLOGY

## 2021-02-02 PROCEDURE — 76870 US EXAM SCROTUM: CPT

## 2021-02-02 PROCEDURE — 74177 CT ABD & PELVIS W/CONTRAST: CPT

## 2021-02-02 PROCEDURE — 74011250636 HC RX REV CODE- 250/636: Performed by: EMERGENCY MEDICINE

## 2021-02-02 PROCEDURE — 80053 COMPREHEN METABOLIC PANEL: CPT

## 2021-02-02 PROCEDURE — 99285 EMERGENCY DEPT VISIT HI MDM: CPT

## 2021-02-02 PROCEDURE — 87635 SARS-COV-2 COVID-19 AMP PRB: CPT

## 2021-02-02 RX ORDER — SODIUM CHLORIDE 0.9 % (FLUSH) 0.9 %
5-40 SYRINGE (ML) INJECTION EVERY 8 HOURS
Status: DISCONTINUED | OUTPATIENT
Start: 2021-02-02 | End: 2021-02-05 | Stop reason: HOSPADM

## 2021-02-02 RX ORDER — AMLODIPINE BESYLATE 5 MG/1
10 TABLET ORAL
Status: DISCONTINUED | OUTPATIENT
Start: 2021-02-02 | End: 2021-02-05 | Stop reason: HOSPADM

## 2021-02-02 RX ORDER — ACETAMINOPHEN 650 MG/1
650 SUPPOSITORY RECTAL
Status: DISCONTINUED | OUTPATIENT
Start: 2021-02-02 | End: 2021-02-05 | Stop reason: HOSPADM

## 2021-02-02 RX ORDER — ATORVASTATIN CALCIUM 10 MG/1
10 TABLET, FILM COATED ORAL
Status: ON HOLD | COMMUNITY
End: 2021-02-03

## 2021-02-02 RX ORDER — ACETAMINOPHEN 325 MG/1
650 TABLET ORAL
Status: DISCONTINUED | OUTPATIENT
Start: 2021-02-02 | End: 2021-02-05 | Stop reason: HOSPADM

## 2021-02-02 RX ORDER — ENOXAPARIN SODIUM 100 MG/ML
40 INJECTION SUBCUTANEOUS DAILY
Status: DISCONTINUED | OUTPATIENT
Start: 2021-02-03 | End: 2021-02-05 | Stop reason: HOSPADM

## 2021-02-02 RX ORDER — LEVOFLOXACIN 5 MG/ML
500 INJECTION, SOLUTION INTRAVENOUS EVERY 24 HOURS
Status: DISCONTINUED | OUTPATIENT
Start: 2021-02-02 | End: 2021-02-05 | Stop reason: HOSPADM

## 2021-02-02 RX ORDER — LEVOFLOXACIN 5 MG/ML
750 INJECTION, SOLUTION INTRAVENOUS
Status: DISCONTINUED | OUTPATIENT
Start: 2021-02-02 | End: 2021-02-02

## 2021-02-02 RX ORDER — ONDANSETRON 2 MG/ML
4 INJECTION INTRAMUSCULAR; INTRAVENOUS
Status: DISCONTINUED | OUTPATIENT
Start: 2021-02-02 | End: 2021-02-05 | Stop reason: HOSPADM

## 2021-02-02 RX ORDER — ATORVASTATIN CALCIUM 10 MG/1
10 TABLET, FILM COATED ORAL
Status: DISCONTINUED | OUTPATIENT
Start: 2021-02-02 | End: 2021-02-05 | Stop reason: HOSPADM

## 2021-02-02 RX ORDER — LISINOPRIL 20 MG/1
40 TABLET ORAL
Status: DISCONTINUED | OUTPATIENT
Start: 2021-02-02 | End: 2021-02-05 | Stop reason: HOSPADM

## 2021-02-02 RX ORDER — SODIUM CHLORIDE 0.9 % (FLUSH) 0.9 %
5-40 SYRINGE (ML) INJECTION AS NEEDED
Status: DISCONTINUED | OUTPATIENT
Start: 2021-02-02 | End: 2021-02-05 | Stop reason: HOSPADM

## 2021-02-02 RX ORDER — POLYETHYLENE GLYCOL 3350 17 G/17G
17 POWDER, FOR SOLUTION ORAL DAILY PRN
Status: DISCONTINUED | OUTPATIENT
Start: 2021-02-02 | End: 2021-02-05 | Stop reason: HOSPADM

## 2021-02-02 RX ORDER — SODIUM CHLORIDE 9 MG/ML
130 INJECTION, SOLUTION INTRAVENOUS CONTINUOUS
Status: DISCONTINUED | OUTPATIENT
Start: 2021-02-02 | End: 2021-02-05 | Stop reason: HOSPADM

## 2021-02-02 RX ORDER — PROMETHAZINE HYDROCHLORIDE 25 MG/1
12.5 TABLET ORAL
Status: DISCONTINUED | OUTPATIENT
Start: 2021-02-02 | End: 2021-02-05 | Stop reason: HOSPADM

## 2021-02-02 RX ADMIN — LISINOPRIL 40 MG: 20 TABLET ORAL at 21:37

## 2021-02-02 RX ADMIN — SODIUM CHLORIDE 1000 ML: 9 INJECTION, SOLUTION INTRAVENOUS at 17:50

## 2021-02-02 RX ADMIN — PIPERACILLIN AND TAZOBACTAM 3.38 G: 3; .375 INJECTION, POWDER, LYOPHILIZED, FOR SOLUTION INTRAVENOUS at 17:52

## 2021-02-02 RX ADMIN — VANCOMYCIN HYDROCHLORIDE 1750 MG: 10 INJECTION, POWDER, LYOPHILIZED, FOR SOLUTION INTRAVENOUS at 18:34

## 2021-02-02 RX ADMIN — SODIUM CHLORIDE 130 ML/HR: 9 INJECTION, SOLUTION INTRAVENOUS at 21:24

## 2021-02-02 RX ADMIN — ATORVASTATIN CALCIUM 10 MG: 20 TABLET, FILM COATED ORAL at 21:38

## 2021-02-02 RX ADMIN — IOPAMIDOL 100 ML: 755 INJECTION, SOLUTION INTRAVENOUS at 21:05

## 2021-02-02 RX ADMIN — LEVOFLOXACIN 500 MG: 5 INJECTION, SOLUTION INTRAVENOUS at 21:34

## 2021-02-02 RX ADMIN — AMLODIPINE BESYLATE 10 MG: 5 TABLET ORAL at 21:37

## 2021-02-02 RX ADMIN — Medication 10 ML: at 21:39

## 2021-02-02 NOTE — PROGRESS NOTES
[facility]: MED RECONCILIATION    Comments/Recommendations:   Patient has not taken his medications in ~1 week due to not feeling well    Information obtained from: Patient, RxQuery, Chart review    Allergies: Patient has no known allergies. Prior to Admission Medications:     Medication Documentation Review Audit       Reviewed by Lorelei Tucker, KASHIFD (Pharmacist) on 02/02/21 at [address] [place] [address]      Medication Sig Documenting Provider Last Dose Status Taking? amLODIPine (NORVASC) 10 mg tablet TAKE 1 TABLET BY MOUTH EVERY DAY Blayne Brunson, DO 1/26/2021 Active Yes   atorvastatin (LIPITOR) 10 mg tablet Take 10 mg by mouth nightly.  Provider, Historical 1/26/2021 Active Yes   lisinopriL (PRINIVIL, ZESTRIL) 40 mg tablet TAKE 1 TABLET BY MOUTH EVERY DAY Blayne Brunson, DO 1/26/2021 Active Yes   potassium chloride (Klor-Con M20) 20 mEq tablet TAKE 1 TABLET BY MOUTH EVERY DAY Phoebe Show, DO 1/26/2021 Active Yes                  ThanksThomas, PHARMD   Contact: [phone]

## 2021-02-02 NOTE — ED TRIAGE NOTES
Patient reports left sided abdominal pain that radiates to his prostate with nausea and diarrhea since Sunday. [facility], WBC 36.5

## 2021-02-02 NOTE — ED PROVIDER NOTES
70yo M with PMH hypertension and hyperlipidemia presents with left sided abdominal pain that radiates to the testicles. [facility] WBC 36.5. Pain started on Sunday while keyon was sitting and watching TV. Associated with nausea and diarrhea. States that he is incontinent of stool every time he urinates. Complains of severe dysuria worst at the end of the stream and hematuria since Sunday, and urgency, frequency, and nocturia which started 3 months ago. No pain with ejaculation. No history of prostatitis or UTI. Past Medical History:   Diagnosis Date    Essential hypertension     Hypercholesterolemia        No past surgical history on file.       Family History:   Problem Relation Age of Onset    Diabetes Mother        Social History     Socioeconomic History    Marital status:      Spouse name: Not on file    Number of children: Not on file    Years of education: Not on file    Highest education level: Not on file   Occupational History    Not on file   Social Needs    Financial resource strain: Not on file    Food insecurity     Worry: Not on file     Inability: Not on file    Transportation needs     Medical: Not on file     Non-medical: Not on file   Tobacco Use    Smoking status: Current Every Day Smoker     Packs/day: 0.25     Years: 30.00     Pack years: 7.50     Types: Cigarettes    Smokeless tobacco: Never Used   Substance and Sexual Activity    Alcohol use: Yes     Comment: socially     Drug use: Never    Sexual activity: Not Currently   Lifestyle    Physical activity     Days per week: Not on file     Minutes per session: Not on file    Stress: Not on file   Relationships    Social connections     Talks on phone: Not on file     Gets together: Not on file     Attends Anglican service: Not on file     Active member of club or organization: Not on file     Attends meetings of clubs or organizations: Not on file     Relationship status: Not on file    Intimate partner violence     Fear of current or ex partner: Not on file     Emotionally abused: Not on file     Physically abused: Not on file     Forced sexual activity: Not on file   Other Topics Concern    Not on file   Social History Narrative    Not on file         ALLERGIES: Patient has no known allergies. Review of Systems   Constitutional: Negative for chills and fever. HENT: Negative for rhinorrhea and sore throat. Respiratory: Negative for cough and shortness of breath. Cardiovascular: Negative for chest pain and leg swelling. Gastrointestinal: Positive for abdominal pain, diarrhea and nausea. Negative for blood in stool and vomiting. Genitourinary: Positive for dysuria, frequency, hematuria, penile pain, scrotal swelling, testicular pain and urgency. Musculoskeletal: Positive for back pain. Skin: Negative for rash. Neurological: Positive for headaches. Psychiatric/Behavioral: Negative for agitation and confusion. Vitals:    02/02/21 1429 02/02/21 1638 02/02/21 1642   BP: (!) 162/95 125/84    Pulse: (!) 111 (!) 106    Resp: 18 24    Temp: 100.1 °F (37.8 °C) 98.8 °F (37.1 °C)    SpO2: 98% 94% 97%   Weight: 89.8 kg (198 lb)     Height: 5' 8\" (1.727 m)              Physical Exam  Constitutional:       General: He is not in acute distress. Appearance: He is not toxic-appearing. HENT:      Head: Normocephalic. Eyes:      Extraocular Movements: Extraocular movements intact. Cardiovascular:      Rate and Rhythm: Regular rhythm. Tachycardia present. Heart sounds: No murmur. No friction rub. No gallop. Pulmonary:      Effort: Pulmonary effort is normal. No respiratory distress. Breath sounds: Normal breath sounds. Abdominal:      General: Bowel sounds are normal. There is no distension. Palpations: Abdomen is soft. Tenderness: There is abdominal tenderness in the suprapubic area, left upper quadrant and left lower quadrant.  There is left CVA tenderness. Genitourinary:     Testes:         Left: Tenderness and swelling present. Prostate: Not tender. Comments: Left testicle erythematous  Skin:     General: Skin is warm and dry. Neurological:      General: No focal deficit present. Mental Status: He is alert. MDM  Number of Diagnoses or Management Options  Acute cystitis without hematuria  Epididymitis  Sepsis without acute organ dysfunction, due to unspecified organism [facility])  Diagnosis management comments: Epididymitis. I suspect there is associated cystitis and possible pyelonephritis due to suprapubic, LLQ, and left flank tenderness. Prostate nontender. UA abnormal with 4+ bacteria. - UCx, BCx pending  - s/p 1 L NS  - admit for IV antibiotics         Amount and/or Complexity of Data Reviewed  Decide to obtain previous medical records or to obtain history from someone other than the patient: yes          Perfect Serve Consult for Admission  6:49 PM    ED Room Number: ER16/16  Patient Name and age:  Eliane Duranorest 76 y.o.  male  Working Diagnosis:   1. Epididymitis    2. Acute cystitis without hematuria    3. Sepsis without acute organ dysfunction, due to unspecified organism ([facility] [address]        COVID-19 Suspicion:  no  Sepsis present:  yes  Reassessment needed: yes  Code Status:  Full Code  Readmission: no  Isolation Requirements:  no  Recommended Level of Care:  med/surg  Department:[facility] ED - [phone]  Other: I suspect epididymitis, cystits, and possible pyelonephritis. Sepsis: , WBC 40, abnormal UA.  Admit for IV antibiotics      Procedures

## 2021-02-02 NOTE — Clinical Note
Status[de-identified] INPATIENT [101]   Type of Bed: Medical [8]   Inpatient Hospitalization Certified Necessary for the Following Reasons: 3.  Patient receiving treatment that can only be provided in an inpatient setting (further clarification in H&P documentation)   Admitting Diagnosis: Complicated UTI (urinary tract infection) [[id number]]   Admitting Physician: Albertina Mullins [[id number]]   Attending Physician: Albertina Mullins [[id number]]   Estimated Length of Stay: 2 Midnights   Discharge Plan[de-identified] Home with Office Follow-up

## 2021-02-03 DIAGNOSIS — I10 ESSENTIAL HYPERTENSION: ICD-10-CM

## 2021-02-03 DIAGNOSIS — E78.2 MIXED HYPERLIPIDEMIA: ICD-10-CM

## 2021-02-03 LAB
ANION GAP SERPL CALC-SCNC: 9 MMOL/L (ref 5–15)
ATRIAL RATE: 89 BPM
BASOPHILS # BLD: 0 K/UL (ref 0–0.1)
BASOPHILS NFR BLD: 0 % (ref 0–1)
BUN SERPL-MCNC: 13 MG/DL (ref 6–20)
BUN/CREAT SERPL: 14 (ref 12–20)
C TRACH DNA SPEC QL NAA+PROBE: NEGATIVE
CALCIUM SERPL-MCNC: 8.7 MG/DL (ref 8.5–10.1)
CALCULATED P AXIS, ECG09: 45 DEGREES
CALCULATED R AXIS, ECG10: 9 DEGREES
CALCULATED T AXIS, ECG11: 15 DEGREES
CHLORIDE SERPL-SCNC: 105 MMOL/L (ref 97–108)
CO2 SERPL-SCNC: 22 MMOL/L (ref 21–32)
COMMENT, HOLDF: NORMAL
CREAT SERPL-MCNC: 0.92 MG/DL (ref 0.7–1.3)
DIAGNOSIS, 93000: NORMAL
DIFFERENTIAL METHOD BLD: ABNORMAL
EOSINOPHIL # BLD: 0 K/UL (ref 0–0.4)
EOSINOPHIL NFR BLD: 0 % (ref 0–7)
ERYTHROCYTE [DISTWIDTH] IN BLOOD BY AUTOMATED COUNT: 12.7 % (ref 11.5–14.5)
GLUCOSE SERPL-MCNC: 110 MG/DL (ref 65–100)
HCT VFR BLD AUTO: 33.9 % (ref 36.6–50.3)
HGB BLD-MCNC: 11.2 G/DL (ref 12.1–17)
IMM GRANULOCYTES # BLD AUTO: 0 K/UL
IMM GRANULOCYTES NFR BLD AUTO: 0 %
LYMPHOCYTES # BLD: 1.8 K/UL (ref 0.8–3.5)
LYMPHOCYTES NFR BLD: 5 % (ref 12–49)
MCH RBC QN AUTO: 29.5 PG (ref 26–34)
MCHC RBC AUTO-ENTMCNC: 33 G/DL (ref 30–36.5)
MCV RBC AUTO: 89.2 FL (ref 80–99)
MONOCYTES # BLD: 1.1 K/UL (ref 0–1)
MONOCYTES NFR BLD: 3 % (ref 5–13)
N GONORRHOEA DNA SPEC QL NAA+PROBE: NEGATIVE
NEUTS BAND NFR BLD MANUAL: 3 % (ref 0–6)
NEUTS SEG # BLD: 34 K/UL (ref 1.8–8)
NEUTS SEG NFR BLD: 89 % (ref 32–75)
NRBC # BLD: 0 K/UL (ref 0–0.01)
NRBC BLD-RTO: 0 PER 100 WBC
P-R INTERVAL, ECG05: 192 MS
PLATELET # BLD AUTO: 320 K/UL (ref 150–400)
PMV BLD AUTO: 10.2 FL (ref 8.9–12.9)
POTASSIUM SERPL-SCNC: 3 MMOL/L (ref 3.5–5.1)
Q-T INTERVAL, ECG07: 364 MS
QRS DURATION, ECG06: 98 MS
QTC CALCULATION (BEZET), ECG08: 442 MS
RBC # BLD AUTO: 3.8 M/UL (ref 4.1–5.7)
RBC MORPH BLD: ABNORMAL
SAMPLE TYPE: NORMAL
SAMPLES BEING HELD,HOLD: NORMAL
SERVICE CMNT-IMP: NORMAL
SODIUM SERPL-SCNC: 136 MMOL/L (ref 136–145)
SPECIMEN SOURCE: NORMAL
VENTRICULAR RATE, ECG03: 89 BPM
WBC # BLD AUTO: 36.9 K/UL (ref 4.1–11.1)

## 2021-02-03 PROCEDURE — 99222 1ST HOSP IP/OBS MODERATE 55: CPT | Performed by: FAMILY MEDICINE

## 2021-02-03 PROCEDURE — 65660000000 HC RM CCU STEPDOWN

## 2021-02-03 PROCEDURE — 94760 N-INVAS EAR/PLS OXIMETRY 1: CPT

## 2021-02-03 PROCEDURE — 80048 BASIC METABOLIC PNL TOTAL CA: CPT

## 2021-02-03 PROCEDURE — 74011250636 HC RX REV CODE- 250/636: Performed by: STUDENT IN AN ORGANIZED HEALTH CARE EDUCATION/TRAINING PROGRAM

## 2021-02-03 PROCEDURE — 74011250637 HC RX REV CODE- 250/637: Performed by: STUDENT IN AN ORGANIZED HEALTH CARE EDUCATION/TRAINING PROGRAM

## 2021-02-03 PROCEDURE — 74011250636 HC RX REV CODE- 250/636: Performed by: FAMILY MEDICINE

## 2021-02-03 PROCEDURE — 93005 ELECTROCARDIOGRAM TRACING: CPT

## 2021-02-03 PROCEDURE — 85025 COMPLETE CBC W/AUTO DIFF WBC: CPT

## 2021-02-03 PROCEDURE — 74011000258 HC RX REV CODE- 258: Performed by: STUDENT IN AN ORGANIZED HEALTH CARE EDUCATION/TRAINING PROGRAM

## 2021-02-03 PROCEDURE — 36415 COLL VENOUS BLD VENIPUNCTURE: CPT

## 2021-02-03 RX ORDER — LISINOPRIL 40 MG/1
TABLET ORAL
Qty: 90 TAB | Refills: 1 | Status: SHIPPED | OUTPATIENT
Start: 2021-02-03 | End: 2021-08-03

## 2021-02-03 RX ORDER — POTASSIUM CHLORIDE 7.45 MG/ML
10 INJECTION INTRAVENOUS
Status: COMPLETED | OUTPATIENT
Start: 2021-02-03 | End: 2021-02-03

## 2021-02-03 RX ORDER — ATORVASTATIN CALCIUM 10 MG/1
TABLET, FILM COATED ORAL
Qty: 90 TAB | Refills: 1 | Status: SHIPPED | OUTPATIENT
Start: 2021-02-03 | End: 2021-08-03

## 2021-02-03 RX ADMIN — Medication 10 ML: at 04:41

## 2021-02-03 RX ADMIN — Medication 10 ML: at 14:00

## 2021-02-03 RX ADMIN — POTASSIUM CHLORIDE 10 MEQ: 10 INJECTION, SOLUTION INTRAVENOUS at 10:23

## 2021-02-03 RX ADMIN — Medication 10 ML: at 20:43

## 2021-02-03 RX ADMIN — ACETAMINOPHEN 650 MG: 325 TABLET ORAL at 20:50

## 2021-02-03 RX ADMIN — VANCOMYCIN HYDROCHLORIDE 1000 MG: 1 INJECTION, POWDER, LYOPHILIZED, FOR SOLUTION INTRAVENOUS at 07:43

## 2021-02-03 RX ADMIN — POTASSIUM CHLORIDE 10 MEQ: 10 INJECTION, SOLUTION INTRAVENOUS at 09:39

## 2021-02-03 RX ADMIN — VANCOMYCIN HYDROCHLORIDE 1000 MG: 1 INJECTION, POWDER, LYOPHILIZED, FOR SOLUTION INTRAVENOUS at 23:04

## 2021-02-03 RX ADMIN — SODIUM CHLORIDE 130 ML/HR: 9 INJECTION, SOLUTION INTRAVENOUS at 17:39

## 2021-02-03 RX ADMIN — PIPERACILLIN AND TAZOBACTAM 3.38 G: 3; .375 INJECTION, POWDER, LYOPHILIZED, FOR SOLUTION INTRAVENOUS at 16:03

## 2021-02-03 RX ADMIN — POTASSIUM CHLORIDE 10 MEQ: 10 INJECTION, SOLUTION INTRAVENOUS at 07:43

## 2021-02-03 RX ADMIN — VANCOMYCIN HYDROCHLORIDE 1000 MG: 1 INJECTION, POWDER, LYOPHILIZED, FOR SOLUTION INTRAVENOUS at 17:39

## 2021-02-03 RX ADMIN — PIPERACILLIN AND TAZOBACTAM 3.38 G: 3; .375 INJECTION, POWDER, LYOPHILIZED, FOR SOLUTION INTRAVENOUS at 00:38

## 2021-02-03 RX ADMIN — ATORVASTATIN CALCIUM 10 MG: 20 TABLET, FILM COATED ORAL at 20:42

## 2021-02-03 RX ADMIN — LISINOPRIL 40 MG: 20 TABLET ORAL at 20:42

## 2021-02-03 RX ADMIN — ENOXAPARIN SODIUM 40 MG: 40 INJECTION SUBCUTANEOUS at 09:42

## 2021-02-03 RX ADMIN — LEVOFLOXACIN 500 MG: 5 INJECTION, SOLUTION INTRAVENOUS at 20:42

## 2021-02-03 RX ADMIN — AMLODIPINE BESYLATE 10 MG: 5 TABLET ORAL at 20:42

## 2021-02-03 RX ADMIN — PIPERACILLIN AND TAZOBACTAM 3.38 G: 3; .375 INJECTION, POWDER, LYOPHILIZED, FOR SOLUTION INTRAVENOUS at 09:48

## 2021-02-03 RX ADMIN — SODIUM CHLORIDE 130 ML/HR: 9 INJECTION, SOLUTION INTRAVENOUS at 04:40

## 2021-02-03 NOTE — H&P
[address] [facility] [address] [place] [address]   Office [phone]  Fax [phone]       Admission H&P     Name: Carmen Mcdonald MRN: [id number]  Sex: Male   YOB: 1955  Age: 76 y.o. PCP: Tom Oliveira DO     Source of Information: patient, medical records    Chief complaint: LLQ abd, L testicular pain    History of Present Illness  Carmen Mcdonald is a 76 y.o. male with PMHx of HTN, HLD, prediabetes who presents to the ED complaining of LLQ abd pain and L testicular pain. Pt states he has been experiencing L testicular pain, LLQ abd pain, and dysuria x3 days. He has also had nausea and vomiting during that time w/ complaints of some diarrhea. The pain acutely worsened today and he went to [facility] where he was found to have an elevated WBC, at which point he presented straight to the ED. Pt denies HA, SOB, CP, fever, chills, hematuria, hematochezia. He has been eating and drinking OK and denies reduced urine output. COVID Questions:   Experiencing any of the following symptoms: fever, chills, cough, SOB, diarrhea, URI symptoms. No  Any Sick contacts with fever, cough, diarrhea, SOB, URI symptoms. No  Traveled out of state or out of country. No  Lives with spouse. Has been staying at home.  Yes    In the ED:  Vitals: Temp 100.1   /95      RR 18   SatO2  98% on RA  Labs: WBC 40.2, creat 1.22 (b/l 1), UA w/ mod LE, mod blood, 4+ bacteria, >100 WBC, lactic acid 1.3  Imaging: none  Treatment: vanc, zosyn, levaquin, 1L NS    EKG: NA    Patient Vitals for the past 12 hrs:   Temp Pulse Resp BP SpO2   02/02/21 1845  100 22 130/82 97 %   02/02/21 1830  100 27 135/86 94 %   02/02/21 1815  98 21 (!) 149/104 95 %   02/02/21 1800  (!) 103 27 (!) 147/96 94 %   02/02/21 1642     97 %   02/02/21 1638 98.8 °F (37.1 °C) (!) 106 24 125/84 94 %   02/02/21 1429 100.1 °F (37.8 °C) (!) 111 18 (!) 162/95 98 %       Review of Systems  Review of Systems   Constitutional: Negative for appetite change, chills, diaphoresis and fever. HENT: Negative for congestion, rhinorrhea, sinus pain and sore throat. Respiratory: Negative for cough, shortness of breath and wheezing. Cardiovascular: Negative for chest pain, palpitations and leg swelling. Gastrointestinal: Positive for abdominal pain, diarrhea, nausea and vomiting. Genitourinary: Positive for dysuria, frequency, penile pain, scrotal swelling and testicular pain. Negative for decreased urine volume, flank pain and hematuria. Musculoskeletal: Negative for arthralgias, back pain, joint swelling and myalgias. Skin: Negative for rash. Neurological: Negative for dizziness, weakness, light-headedness and headaches. Psychiatric/Behavioral: Negative for confusion and decreased concentration. The patient is not nervous/anxious. Home Medications   Prior to Admission medications    Medication Sig Start Date End Date Taking? Authorizing Provider   atorvastatin (LIPITOR) 10 mg tablet Take 10 mg by mouth nightly. Yes Provider, Historical   amLODIPine (NORVASC) 10 mg tablet TAKE 1 TABLET BY MOUTH EVERY DAY 8/26/20  Yes Blayne Brunson DO   potassium chloride (Klor-Con M20) 20 mEq tablet TAKE 1 TABLET BY MOUTH EVERY DAY 8/18/20  Yes Blayne Brunson DO   lisinopriL (PRINIVIL, ZESTRIL) 40 mg tablet TAKE 1 TABLET BY MOUTH EVERY DAY 8/11/20  Yes Blayne Brunson DO   atorvastatin (LIPITOR) 10 mg tablet TAKE 1 TABLET BY MOUTH EVERY DAY 7/22/20 2/2/21  Andrew Jensen H, DO       Allergies  No Known Allergies    Past Medical History  Past Medical History:   Diagnosis Date    Essential hypertension     Hypercholesterolemia        Previous Hospitalization(s)  No past surgical history on file.     Family History  Family History   Problem Relation Age of Onset    Diabetes Mother        Social History  Alcohol history: Rarely  Smoking history: Smokes 0.25 ppd x 30 years  Illicit drug history: Not at all  Living arrangement: patient lives with their spouse. Ambulates: Independently     Vital Signs  Visit Vitals  /82   Pulse 100   Temp 98.8 °F (37.1 °C)   Resp 22   Ht 5' 8\" (1.727 m)   Wt 198 lb (89.8 kg)   SpO2 97%   BMI 30.11 kg/m²     *due to COVID-19 pandemic and limitations on patient contact the physical exam has been performed and reported by [facility] senior resident Dr. Roe Bright      Physical Exam  General: No acute distress. Alert. Cooperative. Head: Normocephalic. Atraumatic. Eyes:              Conjunctiva pink. Sclera white. Ears:              Hearing grossly intact. Nose:             Septum midline. Mucosa pink. No drainage. Throat: Mucosa pink. Moist mucous membranes. No tonsillar exudates or erythema. Palate movement equal bilaterally. Neck: Supple. Normal ROM. No stiffness. Respiratory: CTAB. No w/r/r/c.   Cardiovascular: Tachycardic, regular rhythm. Normal S1,S2. No m/r/g. Pulses 2+ throughout. Good cap refill. GI: + bowel sounds. Mild tenderness to palpation LLQ, no rebound or guarding. Nondistended. : L testicle w/ mild erythema and swelling, extremely tender to palpation. Positive Prehn sign. Prostate mildly enlarge but w/o nodularity, nontender to palpation. Extremities: Absent LE edema. Distal pulses intact. Musculoskeletal: Full ROM in all extremities. Skin: Warm, dry. No rashes. Neuro: CN II-XII grossly intact. Strength 5/5 in all extremities.         Laboratory Data  Recent Results (from the past 8 hour(s))   CBC WITH AUTOMATED DIFF    Collection Time: 02/02/21  3:39 PM   Result Value Ref Range    WBC 40.2 (H) 4.1 - 11.1 K/uL    RBC 4.46 4.10 - 5.70 M/uL    HGB 13.4 12.1 - 17.0 g/dL    HCT 39.5 36.6 - 50.3 %    MCV 88.6 80.0 - 99.0 FL    MCH 30.0 26.0 - 34.0 PG    MCHC 33.9 30.0 - 36.5 g/dL    RDW 12.6 11.5 - 14.5 %    PLATELET 257 307 - 967 K/uL    MPV 9.9 8.9 - 12.9 FL    NRBC 0.0 0  WBC    ABSOLUTE NRBC 0.00 0.00 - 0.01 K/uL    NEUTROPHILS 90 (H) 32 - 75 %    LYMPHOCYTES 5 (L) 12 - 49 %    MONOCYTES 4 (L) 5 - 13 %    EOSINOPHILS 0 0 - 7 %    BASOPHILS 0 0 - 1 %    IMMATURE GRANULOCYTES 1 (H) 0.0 - 0.5 %    ABS. NEUTROPHILS 36.2 (H) 1.8 - 8.0 K/UL    ABS. LYMPHOCYTES 2.0 0.8 - 3.5 K/UL    ABS. MONOCYTES 1.6 (H) 0.0 - 1.0 K/UL    ABS. EOSINOPHILS 0.0 0.0 - 0.4 K/UL    ABS. BASOPHILS 0.0 0.0 - 0.1 K/UL    ABS. IMM. GRANS. 0.4 (H) 0.00 - 0.04 K/UL    DF AUTOMATED      RBC COMMENTS NORMOCYTIC, NORMOCHROMIC     METABOLIC PANEL, COMPREHENSIVE    Collection Time: 02/02/21  3:39 PM   Result Value Ref Range    Sodium 136 136 - 145 mmol/L    Potassium 3.5 3.5 - 5.1 mmol/L    Chloride 103 97 - 108 mmol/L    CO2 25 21 - 32 mmol/L    Anion gap 8 5 - 15 mmol/L    Glucose 122 (H) 65 - 100 mg/dL    BUN 14 6 - 20 MG/DL    Creatinine 1.22 0.70 - 1.30 MG/DL    BUN/Creatinine ratio 11 (L) 12 - 20      GFR est AA >60 >60 ml/min/1.73m2    GFR est non-AA 59 (L) >60 ml/min/1.73m2    Calcium 9.2 8.5 - 10.1 MG/DL    Bilirubin, total 0.9 0.2 - 1.0 MG/DL    ALT (SGPT) 13 12 - 78 U/L    AST (SGOT) 9 (L) 15 - 37 U/L    Alk.  phosphatase 102 45 - 117 U/L    Protein, total 8.6 (H) 6.4 - 8.2 g/dL    Albumin 3.2 (L) 3.5 - 5.0 g/dL    Globulin 5.4 (H) 2.0 - 4.0 g/dL    A-G Ratio 0.6 (L) 1.1 - 2.2     LIPASE    Collection Time: 02/02/21  3:39 PM   Result Value Ref Range    Lipase 83 73 - 393 U/L   URINALYSIS W/MICROSCOPIC    Collection Time: 02/02/21  3:57 PM   Result Value Ref Range    Color DARK YELLOW      Appearance TURBID (A) CLEAR      Specific gravity 1.023 1.003 - 1.030      pH (UA) 5.5 5.0 - 8.0      Protein 100 (A) NEG mg/dL    Glucose Negative NEG mg/dL    Ketone TRACE (A) NEG mg/dL    Blood MODERATE (A) NEG      Urobilinogen 1.0 0.2 - 1.0 EU/dL    Nitrites Negative NEG      Leukocyte Esterase MODERATE (A) NEG      WBC >100 (H) 0 - 4 /hpf    RBC 0-5 0 - 5 /hpf    Epithelial cells FEW FEW /lpf    Bacteria 4+ (A) NEG /hpf    Hyaline cast 0-2 0 - 5 /lpf   BILIRUBIN, CONFIRM    Collection Time: 02/02/21  3:57 PM   Result Value Ref Range    Bilirubin UA, confirm Negative NEG     LACTIC ACID    Collection Time: 02/02/21  5:43 PM   Result Value Ref Range    Lactic acid 1.3 0.4 - 2.0 MMOL/L       Imaging  CXR Results  (Last 48 hours)    None        CT Results  (Last 48 hours)    None            Assessment and Plan     Bogdan Mendez is a 76 y.o. male with a PMHx of  HTN, HLD, prediabetes  who is admitted for sepsis. Sepsis: 3/4 SIRs criteria (HR, RR, WBC). 2/2 epididymitis vs complicated UTI vs pyelo vs enterocolitis. Positive Prehn sign suggests epididymitis. POA WBC 40.2, UA w/ mod LE, mod blood, 4+ bacteria, >100 WBC, lactic acid 1.3. S/p zosyn, vanc, levaquin in ED.  -Admit to remote tele, vitals per unit routine  -Continue zosyn 3.375g Q6h, levaquin 500 daily, pharm to dose vanc  -BCx, UCx pending  -GC/CT pending  -Will consult urology  -CT abd/pelv, scrotal US pending  -Daily CBC  -Will obtain EKG for tachycardia    Covid PUI: No known sick contacts or Covid exposure however pt w/ complaints of abd pain, N/V, diarrhea. -Rapid Covid test pending    Elevated creatinine: POA 1.22 (b/l 1.0). Likely 2/2 IVVD. -MIVF  -Daily BMP    Hypertension: BP on admission 162/95.  -Continue home amlodipine 10mg daily, lisinopril 40mg daily  -Will continue to monitor at this time and readjust as BP's trend    HLD: Lipid panel 8/2020 Tchol 133 HDL 37, LDL 73, tri 113.   -Continue home lipitor 10mg    Prediabetes: A1C 8/2020 5.7. Pt has been attempting management w/ dietary and lifestyle modifications. FEN/GI - Cardiac diet. NS at 130 mL/hr. Activity - Ambulate as tolerated  DVT prophylaxis - Lovenox  GI prophylaxis - Not indicated at this time  Fall prophylaxis - Not indicated at this time. Disposition - Admit to Remote Telemetry. Plan to d/c to Home. Code Status - Full. Discussed with patient / caregivers.   Next of Kin Name and Contact - Fide Wilkinson,  Spouse - [phone]    Patient Bogdan Mendez will be discussed with  Dania Saint.    8:04 PM, 02/02/21  Patrick Ochoa MD  Family Medicine Resident       For Billing    Chief Complaint   Patient presents with   [facility] Abdominal Pain       Hospital Problems  Date Reviewed: 9/9/2020          Codes Class Noted POA    Complicated UTI (urinary tract infection) ICD-10-CM: N39.0  ICD-9-CM: 599.0  2/2/2021 Unknown

## 2021-02-03 NOTE — PROGRESS NOTES
2:29 PM  CM reviewed EMR and met with pt in room to complete the initial evaluation with masks on. Medicare pt has received, reviewed, and signed [facility] letter informing them of their right to appeal the discharge. Signed copy has been placed on pt bedside chart. Reason for Admission:   Complicated UTI                   RUR Score:    9%               Plan for utilizing home health:    Has used in the past      PCP: First and Last name:  Corrie Apley   Name of Practice:    Are you a current patient: Yes/No: Yes   Approximate date of last visit: Been awhile due to pandemic   Can you participate in a virtual visit with your PCP:  Yet                    Current Advanced Directive/Advance Care Plan: Pt is a full code, spouse is her emergency contact                         Transition of Care Plan:                    Pt was independent with all care prior to admission and has no DME needs. He uses [facility]  for prescription needs. 1). Pt admitted for medical management  2). Urology consulted  3). Pt drove himself to the ER and plans to drive himself home  4).  CM will follow for dc needs    Care Management Interventions  PCP Verified by CM: Yes(Due to pandemic, has not been to PCP)  Mode of Transport at Discharge: Self  Transition of Care Consult (CM Consult): Discharge Planning  Physical Therapy Consult: No  Occupational Therapy Consult: No  Speech Therapy Consult: No  Current Support Network: Lives with Spouse  Discharge Location  Discharge Placement: Home with family assistance

## 2021-02-03 NOTE — PROGRESS NOTES
[facility] RESIDENCY PROGRAM  PROGRESS NOTE     2/3/2021  PCP: Lisset Rondon DO     Assessment/Plan:     Lyric Wooten is a 76 y.o. male with a PMHx of  HTN, HLD, prediabetes  who is admitted for sepsis. 24-hour events: none     Sepsis 2/2 likely epididymitis: 3/4 SIRs criteria (HR, RR, WBC). POA WBC 40.2, UA w/ mod LE, mod blood, 4+ bacteria, >100 WBC, lactic acid 1.3. US scrotum suspicious for L epididymoorchitis. CT abd/pelv w/ evidence of prostatitis, L epididymitis, enhancement of L renal pelvis. -Continue zosyn, levaquin, vanc  -BCx, UCx pending  -GC/CT pending  -Urology consulted  -CT abd/pelv, scrotal US pending  -Daily CBC  -EKG pending     Covid PUI: Rapid Covid negative.  -Rapid Covid negative     Elevated creatinine: POA 1.22 (b/l 1.0). Likely 2/2 IVVD. -MIVF  -Daily BMP     Hypertension: BP on admission 162/95.  -Continue home amlodipine 10mg daily, lisinopril 40mg daily  -Will continue to monitor at this time and readjust as BP's trend     HLD: Lipid panel 8/2020 Tchol 133 HDL 37, LDL 73, tri 113.   -Continue home lipitor 10mg     Prediabetes: A1C 8/2020 5.7. Pt has been attempting management w/ dietary and lifestyle modifications.           FEN/GI - Cardiac diet. NS at 130 mL/hr. Activity - Ambulate as tolerated  DVT prophylaxis - Lovenox  GI prophylaxis - Not indicated at this time  Fall prophylaxis - Not indicated at this time. Code Status - Full. Discussed with patient / caregivers. Next of Kin Name and Contact - Nam Lopez,  Spouse - [phone]     Patient Lyric Wooten will be discussed with Dr. Yesi Merino. Subjective:   Pt was seen and examined at bedside. Afebrile and hemodynamically stable. Pt still having some testicular pain. Denies chest pain, SOB, nausea, vomiting, abdominal pain, dizziness.      Objective:   Physical examination  Patient Vitals for the past 24 hrs:   Temp Pulse Resp BP SpO2   02/03/21 0444 98.8 °F (37.1 °C) 91 22 139/84 94 %   21 2319 99 °F (37.2 °C) (!) 102 22 (!) 167/98 99 %   217 98.3 °F (36.8 °C) 100 23 (!) 152/88 97 %   21  (!) 102 24 (!) 152/88 99 %   21  100 (!) 31 138/79 94 %   21  99 28 (!) 153/95 93 %   21  (!) 101 21 (!) 159/93 95 %   21  (!) 103 28 (!) 148/88 95 %   21 194  (!) 103 25 (!) 156/121 94 %   21 193  (!) 101  (!) 141/78    21 191  (!) 101 22 132/87 93 %   21 1900  100 28 139/80 96 %   21 1845  100 22 130/82 97 %   21 1830  100 27 135/86 94 %   21 1815  98 21 (!) 149/104 95 %   21 1800  (!) 103 27 (!) 147/96 94 %   21 1642     97 %   21 1638 98.8 °F (37.1 °C) (!) 106 24 125/84 94 %   21 1429 100.1 °F (37.8 °C) (!) 111 18 (!) 162/95 98 %      Temp (24hrs), Av °F (37.2 °C), Min:98.3 °F (36.8 °C), Max:100.1 °F (37.8 °C)         O2 Device: Room air    Date 21 - 21 0621 - 21 0659   Shift 3519-1638 8805-4398 24 Hour Total 8585-6629 0798-6811 24 Hour Total   INTAKE   P.O.  450 450        P. O.  450 450      I. V.(mL/kg/hr) 1100(1)  1100        Volume (sodium chloride 0.9 % bolus infusion 1,000 mL) 1000  1000        Volume (piperacillin-tazobactam (ZOSYN) 3.375 g in 0.9% sodium chloride (MBP/ADV) 100 mL MBP) 100  100      Shift Total(mL/kg) 1100(12.2) 450(5) 1550(17.3)      OUTPUT   Urine(mL/kg/hr)           Urine Occurrence(s)  1 x 1 x      Emesis/NG output           Emesis Occurrence(s)  0 x 0 x      Stool           Stool Occurrence(s)  1 x 1 x      Shift Total(mL/kg)         NET 1064 820 4069      Weight (kg) 89.8 89.8 89.8 89.8 89.8 89.8     *due to COVID-19 pandemic and limitations on patient contact the physical exam has been performed and reported by [facility] Medicine senior resident Dr. Meeks General        Physical Exam  General: No acute distress. Alert. Cooperative. Head: Normocephalic. Atraumatic. Eyes:              Conjunctiva pink. Sclera white. Ears:              Hearing grossly intact. Nose:             Septum midline. Mucosa pink. No drainage. Throat: Mucosa pink. Moist mucous membranes. No tonsillar exudates or erythema. Palate movement equal bilaterally. Neck: Supple. Normal ROM. No stiffness. Respiratory: CTAB. No w/r/r/c.   Cardiovascular: Tachycardic, regular rhythm. Normal S1,S2. No m/r/g. Pulses 2+ throughout. Good cap refill. GI: + bowel sounds. Mild tenderness to palpation LLQ, no rebound or guarding. Nondistended. : L testicle w/ mild erythema and swelling, extremely tender to palpation. Positive Prehn sign. Prostate mildly enlarge but w/o nodularity, nontender to palpation. Extremities: Absent LE edema. Distal pulses intact. Musculoskeletal: Full ROM in all extremities. Skin: Warm, dry. No rashes. Neuro: CN II-XII grossly intact. Strength 5/5 in all extremities. Data Review:     CBC:  Recent Labs     02/03/21  0454 02/02/21  1539   WBC 36.9* 40.2*   HGB 11.2* 13.4   HCT 33.9* 39.5    661     Metabolic Panel:  Recent Labs     02/02/21  1539      K 3.5      CO2 25   BUN 14   CREA 1.22   *   CA 9.2   ALB 3.2*   TBILI 0.9   ALT 13     Micro:  Lab Results   Component Value Date/Time    Culture result: NO GROWTH AFTER 5 HOURS 02/02/2021 05:43 PM    Culture result: NO GROWTH AFTER 5 HOURS 02/02/2021 05:43 PM     Imaging:  Ct Abd Pelv W Cont    Result Date: 2/2/2021  EXAM: CT ABD PELV W CONT INDICATION: Possible pyelo COMPARISON: No prior CTs available CONTRAST: 100 mL of Isovue-370. TECHNIQUE: Following the uneventful intravenous administration of contrast, thin axial images were obtained through the abdomen and pelvis. Coronal and sagittal reconstructions were generated. Oral contrast was not administered.  CT dose reduction was achieved through use of a standardized protocol tailored for this examination and automatic exposure control for dose modulation. FINDINGS: LOWER THORAX: No significant abnormality in the incidentally imaged lower chest. LIVER: Small hypodensity in the left hepatic lobe too small to characterize. BILIARY TREE: Gallbladder is within normal limits. CBD is not dilated. SPLEEN: within normal limits. PANCREAS: No mass or ductal dilatation. ADRENALS: Unremarkable. KIDNEYS: No mass or hydronephrosis. Simple cyst in the left kidney. Mild enhancement of the left renal pelvis. No hydroureter. STOMACH: Unremarkable. SMALL BOWEL: No dilatation or wall thickening. COLON: No dilatation or wall thickening. APPENDIX: Unremarkable PERITONEUM: No ascites or pneumoperitoneum. RETROPERITONEUM: Inflammatory changes in the left retroperitoneum REPRODUCTIVE ORGANS: Edematous prostate gland and seminal vesicles with inflammation around the seminal vesicles. URINARY BLADDER: No mass or calculus. BONES: No destructive bony lesions. Degenerative changes most pronounced at T12-L1 and L1-2 ABDOMINAL WALL: Prominent veins in the left inguinal canal. Large vessels and hyperemia of the left epididymis epididymis ADDITIONAL COMMENTS: N/A     1. Findings most likely representing prostatitis with involvement of the seminal vesicles. Inflammatory changes extending up the left retroperitoneum. 2.  There is mild enhancement of the left renal pelvis which may reflect underlying urinary tract infection. 3.  Engorged left testicular veins and hyperemia around the left epididymis possibly reflecting epididymitis. Us Scrotum/testicles    Result Date: 2/2/2021  EXAM: US SCROTUM/TESTICLES INDICATION: Left testicular pain. COMPARISON: None. TECHNIQUE: Real-time sonography of the scrotum was performed with a high frequency linear transducer. Multiple static images were obtained. Color Doppler evaluation was also performed. FINDINGS: RIGHT TESTICLE: The right testicle is normal in size and echotexture with normal color-flow.  The right testis measures 5.5 x 3.6 x 2.4 cm and the right testicular volume is 24.6 mL. RIGHT EPIDIDYMIS: The right epididymis is normal. LEFT TESTICLE: The left testicle is heterogeneous with increased vascular flow The left testis measures 4.9 x 2.6 x 3.3 cm and the left testicular volume is 22 mL. Small left hydrocele. LEFT EPIDIDYMIS: The left epididymis is enlarged with increased vascularity     1. Findings suspicious for left sided epididymoorchitis. Medications reviewed  Current Facility-Administered Medications   Medication Dose Route Frequency    sodium chloride (NS) flush 5-40 mL  5-40 mL IntraVENous Q8H    sodium chloride (NS) flush 5-40 mL  5-40 mL IntraVENous PRN    acetaminophen (TYLENOL) tablet 650 mg  650 mg Oral Q6H PRN    Or    acetaminophen (TYLENOL) suppository 650 mg  650 mg Rectal Q6H PRN    polyethylene glycol (MIRALAX) packet 17 g  17 g Oral DAILY PRN    promethazine (PHENERGAN) tablet 12.5 mg  12.5 mg Oral Q6H PRN    Or    ondansetron (ZOFRAN) injection 4 mg  4 mg IntraVENous Q6H PRN    enoxaparin (LOVENOX) injection 40 mg  40 mg SubCUTAneous DAILY    0.9% sodium chloride infusion  130 mL/hr IntraVENous CONTINUOUS    piperacillin-tazobactam (ZOSYN) 3.375 g in 0.9% sodium chloride (MBP/ADV) 100 mL MBP  3.375 g IntraVENous Q8H    amLODIPine (NORVASC) tablet 10 mg  10 mg Oral QHS    atorvastatin (LIPITOR) tablet 10 mg  10 mg Oral QHS    lisinopriL (PRINIVIL, ZESTRIL) tablet 40 mg  40 mg Oral QHS    levoFLOXacin (LEVAQUIN) 500 mg in D5W IVPB  500 mg IntraVENous Q24H    vancomycin (VANCOCIN) 1,000 mg in 0.9% sodium chloride 250 mL (VIAL-MATE)  1,000 mg IntraVENous Q12H         Signed:   Dulce Maria Shannon MD   Resident, Family Medicine      Attending note: Attending note to follow. ..

## 2021-02-03 NOTE — PROGRESS NOTES
[address] Dosing Services: Antimicrobial Stewardship Daily Doc 2/3/2021    Consult for antibiotic dosing of Vancomycin by Dr. Figueredo  Indication: Epididymitis versus enterocolitis, cUTI  Day of Therapy 2    Ht Readings from Last 1 Encounters:   02/02/21 172.7 cm (68\")        Wt Readings from Last 1 Encounters:   02/02/21 89.8 kg (198 lb)      Vancomycin therapy:  Current maintenance dose: 1000 mg q12h  Dose calculated to approximate a therapeutic trough of 15-20 mcg/mL. Calculated kinetics predict level ~15.8 mcg/ml with  on ordered dose. MD ordered daily BMP. Plan for level / Adjustment in Therapy: Scr has improved from 1.22 yesterday to 0.92 today. At new renal function current dose expected trough is 10.26 and AUC of 348. Will increase dose to 1000 mg q8h for projected trough of 18.7 and . Will order trough before 4th dose 2/4 AM  Dose administration notes:   Doses given appropriately as scheduled    Date Dose & Interval Measured (mcg/mL) Extrapolated (mcg/mL)   ? ? ? ?   ? ? ? ?   ? ? ? ? Other Antimicrobial   (not dosed by pharmacist) Zosyn 3.375 mg Q8 hrs per attending MD  Levaquin 500 mg Q24 hours per attending MD   Cultures 2/2 Urine: Pending  2/2 Blood x 2: Pending   Serum Creatinine Lab Results   Component Value Date/Time    Creatinine 0.92 02/03/2021 04:54 AM         Creatinine Clearance Estimated Creatinine Clearance: 86 mL/min (based on SCr of 0.92 mg/dL).      Temp Temp: 98.6 °F (37 °C)       WBC Lab Results   Component Value Date/Time    WBC 36.9 (H) 02/03/2021 04:54 AM        H/H Lab Results   Component Value Date/Time    HGB 11.2 (L) 02/03/2021 04:54 AM        Platelets    Lab Results   Component Value Date/Time    PLATELET 688 56/47/5855 04:54 AM            Thanks,  Pharmacist Raymundo Ceballos, PHARMD Contact information: [phone]

## 2021-02-03 NOTE — PROGRESS NOTES
Physician Progress Note      Carmine Bose  CSN #:                  [id number]  :                       1955  ADMIT DATE:       2021 2:26 PM  [address] [place] DATE:  RESPONDING  PROVIDER #:        Moon SOUZA MD          QUERY TEXT:    Dear Resident,  Pt admitted with sepsis. Noted documentation of UTI noted on 2/3/21 by Urology consultant. If possible, please document in progress notes and discharge summary:    The medical record reflects the following:  Risk Factors: 76 yr old male admitted with sepsis. Clinical Indicators: H&P notes, \"Sepsis: 3/4 SIRs criteria (HR, RR, WBC). 2/2 epididymitis vs complicated UTI vs pyelo vs enterocolitis. Positive Prehn sign suggests epididymitis. POA WBC 40.2, UA w/ mod LE, mod blood, 4+ bacteria, >100 WBC, lactic acid 1.3. S/p zosyn, vanc, levaquin in ED. Urology consult notes, \". ..76 y.o. male with with LEFT scrotal pain, epididymitis, UTI\". Treatment: U/A, Urology consult and IV antibiotics, Lab work  Options provided:  -- UTI confirmed present on admission  -- UTI confirmed not present on admission  -- UTI ruled out  -- Other - I will add my own diagnosis  -- Disagree - Not applicable / Not valid  -- Disagree - Clinically unable to determine / Unknown  -- Refer to Clinical Documentation Reviewer    PROVIDER RESPONSE TEXT:    The diagnosis of UTI was confirmed as present on admission.     Query created by: Rubina Chau on 2/3/2021 12:51 PM      Electronically signed by:  Alli Giron MD 2/3/2021 5:53 PM

## 2021-02-03 NOTE — PROGRESS NOTES
[facility]   Senior Resident Admission Note    CC: testicular pain    HPI:  Deisy Thrasher is a 76 y.o. male who presents to the ER complaining of pain in scrotum, LLQ abdominal pain, diarrhea and nausea. Symptoms started abruptly on Sunday, while watching TV. Patient seen at [facility] and sent over to the ED because of WBC of 36. Other symptoms include dysuria, patient is also urinating while sitting on the toilet and often had loose stools while urinating at the same time. Denies saddle anesthesia and back pain, headache, cough, runny nose, sick contacts. Chart reviewed. Patient seen, examined by me due to COVID precautions, and discussed with Dr. Figueredo (PGY-1). See his note for more details. BP (!) 148/88   Pulse (!) 103   Temp 98.8 °F (37.1 °C)   Resp 28   Ht 5' 8\" (1.727 m)   Wt 198 lb (89.8 kg)   SpO2 95%   BMI 30.11 kg/m²     Physical Exam  Exam conducted with a chaperone present. Constitutional:       General: He is not in acute distress. Appearance: Normal appearance. HENT:      Head: Normocephalic and atraumatic. Nose: Nose normal. No congestion. Mouth/Throat:      Mouth: Mucous membranes are moist.      Pharynx: No oropharyngeal exudate. Neck:      Musculoskeletal: Normal range of motion and neck supple. Cardiovascular:      Rate and Rhythm: Tachycardia present. Pulses: Normal pulses. Heart sounds: No murmur. Pulmonary:      Effort: Pulmonary effort is normal. No respiratory distress. Breath sounds: Normal breath sounds. Abdominal:      General: Bowel sounds are normal.      Palpations: Abdomen is soft. Tenderness: There is abdominal tenderness (LLQ). There is no right CVA tenderness or left CVA tenderness. Hernia: There is no hernia in the left inguinal area or right inguinal area. Genitourinary:     Penis: Normal.       Testes: Cremasteric reflex is present.          Right: Tenderness or swelling not present. Left: Tenderness and swelling present. Epididymis:      Right: No tenderness. Left: Tenderness (Prehn sign positive) present. Prostate: Enlarged. Not tender and no nodules present. Rectum: No tenderness, anal fissure or external hemorrhoid. Normal anal tone. Musculoskeletal: Normal range of motion. General: No swelling. Right lower leg: No edema. Left lower leg: No edema. Lymphadenopathy:      Lower Body: No right inguinal adenopathy. No left inguinal adenopathy. Skin:     General: Skin is warm and dry. Capillary Refill: Capillary refill takes less than 2 seconds. Coloration: Skin is not jaundiced. Neurological:      General: No focal deficit present. Mental Status: He is alert. Psychiatric:         Mood and Affect: Mood normal.         Behavior: Behavior normal.         Thought Content: Thought content normal.         Judgment: Judgment normal.       A/P: 76 y.o male with PMH os HTN, dyslipidemia and prediabetes admitted for sepsis 2/2 epididymitis vs complicated UTI vs pyelonephritis vs enterocolitis  1. Sepsis: based on exam physical exam most likely epidymidis, however can't rule out other etiology. -CT abd/pelv, scrotal US pending  -Zosyn, Levaquin, Vanco  -BCx, UCx pending  -GC/CT pending  -Will consult urology    2. Tachycardia: likely 2/2 sepsis  - EKG  - MIVF    3. Elevated Cr: likely 2/2 sepsis  - Daily labs  - MIVF  - Watch Cr closely given abx     4. COVID PUI: will get rapid test, but have low suspicion for COVID at this time     I agree with remaining assessment and plan as documented in Dr. Radhames Valiente note.       Pt discussed with Dr Karl Collier (on-call attending physician)    Darryle Forget, MD  Family Medicine Resident

## 2021-02-03 NOTE — CONSULTS
New Urology Consult Note    Patient: Margaret Hebert MRN: [id number]  SSN: [id number]    YOB: 1955  Age: 76 y.o. Sex: male            Assessment:     Margaret Hebert is a 76 y.o. male with with LEFT scrotal pain, epididymitis, UTI     Recommendations:     1. continue culture driven abx   2. Scrotal support/elevation  3. Control pain   4. No obvious abscess at this time,  If not improving, consider repeat imaging. Plan discussed with Dr. Morgan Ackerman     Thank you for this consult. Please contact [facility] with any further questions/concerns. Eleazar Mason, FNP-C [phone]    History of Present Illness:     Reason for Consult:  Scrotal pain, UTI, prostatitis     Margaret Hebert is seen in consultation for reasons noted above at the request of Felecia Leyva MD.    This is a 76 y.o. male with a history of HTN, HLD, prediabetes admitted for infectious process/sepsis. Suspect urinary etiology. He reports gradual onset of LEFT flank pain, LEFT testicular pain, and dysuria for 3 days. He endorses associated symptoms of nausea/vomiting/diarrhea. The pain suddenly increased in intensity yesterday he described the pain as mod/severe and nothing makes it better or worse, he presented to [facility] for evaluation and was referred to ED for leukocytosis. He is , reports no sexual activity for 5 Years. Subjective     Past Medical History  Past Medical History:   Diagnosis Date    Essential hypertension     Hypercholesterolemia        Past Surgical History:   No past surgical history on file.     Medication:  Current Facility-Administered Medications   Medication Dose Route Frequency Provider Last Rate Last Admin    potassium chloride 10 mEq in 100 ml IVPB  10 mEq IntraVENous Q1H Denis Fan  mL/hr at 02/03/21 1023 10 mEq at 02/03/21 1023    vancomycin (VANCOCIN) 1,000 mg in 0.9% sodium chloride 250 mL (VIAL-MATE)  1,000 mg IntraVENous Jesse Reza MD        [START ON 2/4/2021] Vancomycin trough 2/4 @ 0730   Other ONCE Asia Viera MD        sodium chloride (NS) flush 5-40 mL  5-40 mL IntraVENous Q8H Fela Jackson MD   10 mL at 02/03/21 0441    sodium chloride (NS) flush 5-40 mL  5-40 mL IntraVENous PRN Fela Jackson MD        acetaminophen (TYLENOL) tablet 650 mg  650 mg Oral Q6H PRN Fela Jackson MD        Or   [place] acetaminophen (TYLENOL) suppository 650 mg  650 mg Rectal Q6H PRN Fela Jackson MD        polyethylene glycol [facility]) packet 17 g  17 g Oral DAILY PRN Fela Jackson MD        promethazine (PHENERGAN) tablet 12.5 mg  12.5 mg Oral Q6H PRN Fela Jackson MD        Or    ondansetron [facility]) injection 4 mg  4 mg IntraVENous Q6H PRN Fela Jackson MD        enoxaparin (LOVENOX) injection 40 mg  40 mg SubCUTAneous DAILY Fela Jackson MD   40 mg at 02/03/21 7459    0.9% sodium chloride infusion  130 mL/hr IntraVENous CONTINUOUS Fela Jackson  mL/hr at 02/03/21 0440 130 mL/hr at 02/03/21 0440    piperacillin-tazobactam (ZOSYN) 3.375 g in 0.9% sodium chloride (MBP/ADV) 100 mL MBP  3.375 g IntraVENous Q8H Fela Jackson MD 25 mL/hr at 02/03/21 0948 3.375 g at 02/03/21 0948    amLODIPine (NORVASC) tablet 10 mg  10 mg Oral QHS Fela Jackson MD   10 mg at 02/02/21 2137    atorvastatin (LIPITOR) tablet 10 mg  10 mg Oral QHS Fela Jackson MD   10 mg at 02/02/21 2138    lisinopriL (PRINIVIL, ZESTRIL) tablet 40 mg  40 mg Oral QHS Fela Jackson MD   40 mg at 02/02/21 2137    levoFLOXacin (LEVAQUIN) 500 mg in D5W IVPB  500 mg IntraVENous Q24H Fela Jackson  mL/hr at 02/02/21 2134 500 mg at 02/02/21 2134       Allergies:  No Known Allergies    Social History:  Social History     Tobacco Use    Smoking status: Current Every Day Smoker     Packs/day: 0.25     Years: 30.00     Pack years: 7.50     Types: Cigarettes    Smokeless tobacco: Never Used   Substance Use Topics    Alcohol use: Yes     Comment: socially     Drug use: Never       Family History  Family History   Problem Relation Age of Onset    Diabetes Mother        Review of Systems  ROS from attending provider note from 02/03/21 reviewed and changes (other than per HPI) include : denies nausea/vomiting    Objective:     Vital signs in last 24 hours:  Visit Vitals  BP (!) 144/90 (BP 1 Location: Right upper arm, BP Patient Position: At rest)   Pulse 87   Temp 98.6 °F (37 °C)   Resp 20   Ht 5' 8\" (1.727 m)   Wt 89.8 kg (198 lb)   SpO2 95%   BMI 30.11 kg/m²       Intake/Output last 3 shifts:  Date 02/02/21 0700 - 02/03/21 0659 02/03/21 0700 - 02/04/21 0659   Shift 3704-3841 4669-4675 24 Hour Total 7168-1292 4260-6060 24 Hour Total   INTAKE   P.O.  650 650        P. O.  650 650      I. V.(mL/kg/hr) 1100(1) 1118(1) 2218(1)        Volume (sodium chloride 0.9 % bolus infusion 1,000 mL) 1000  1000        Volume (0.9% sodium chloride infusion)  1118 1118        Volume (piperacillin-tazobactam (ZOSYN) 3.375 g in 0.9% sodium chloride (MBP/ADV) 100 mL MBP) 100  100      Shift Total(mL/kg) 9339(62.7) 1200(68.7) 8629(12.2)      OUTPUT   Urine(mL/kg/hr)    150  150     Urine Voided    150  150     Urine Occurrence(s)  2 x 2 x 1 x  1 x   Emesis/NG output           Emesis Occurrence(s)  0 x 0 x 0 x  0 x   Stool           Stool Occurrence(s)  2 x 2 x 1 x  1 x   Shift Total(mL/kg)    150(1.7)  150(1.7)   NET 1100 1768 2868 -150  -150   Weight (kg) 89.8 89.8 89.8 89.8 89.8 89.8         Physical Exam  General: NAD, A&O,   HEENT: lids normal, normal nose   Pulmonary: Normal work of breathing   Abdomen: soft, NTTP, nondistended  : urinating,  No CVA tenderness; left scrotal edema, tenderness, mild erythema, no crepitus, no urethral discharge; pain is better with scrotal elevation   Gait: not observed, moves all extremities   Neuro: Appropriate, no focal neurological deficits  Mood/Affect: normal    Lab/Imaging Review:       Most Recent Labs:  Lab Results   Component Value Date/Time    WBC 36.9 (H) 02/03/2021 04:54 AM    HGB 11.2 (L) 02/03/2021 04:54 AM    HCT 33.9 (L) 02/03/2021 04:54 AM    PLATELET 320 02/03/2021 04:54 AM    MCV 89.2 02/03/2021 04:54 AM        Lab Results   Component Value Date/Time    Sodium 136 02/03/2021 04:54 AM    Potassium 3.0 (L) 02/03/2021 04:54 AM    Chloride 105 02/03/2021 04:54 AM    CO2 22 02/03/2021 04:54 AM    Anion gap 9 02/03/2021 04:54 AM    Glucose 110 (H) 02/03/2021 04:54 AM    BUN 13 02/03/2021 04:54 AM    Creatinine 0.92 02/03/2021 04:54 AM    BUN/Creatinine ratio 14 02/03/2021 04:54 AM    GFR est AA >60 02/03/2021 04:54 AM    GFR est non-AA >60 02/03/2021 04:54 AM    Calcium 8.7 02/03/2021 04:54 AM    Bilirubin, total 0.9 02/02/2021 03:39 PM    Alk. phosphatase 102 02/02/2021 03:39 PM    Protein, total 8.6 (H) 02/02/2021 03:39 PM    Albumin 3.2 (L) 02/02/2021 03:39 PM    Globulin 5.4 (H) 02/02/2021 03:39 PM    A-G Ratio 0.6 (L) 02/02/2021 03:39 PM    ALT (SGPT) 13 02/02/2021 03:39 PM        Lab Results   Component Value Date/Time    Prostate Specific Ag 0.7 08/18/2020 08:52 AM        COAGS:  No results found for: APTT, PTP, INR, INREXT    Lab Results   Component Value Date/Time    Hemoglobin A1c 5.7 (H) 08/18/2020 08:52 AM        No results found for: CPK, RCK1, RCK2, RCK3, RCK4, CKMB, CKNDX, CKND1, TROPT, TROIQ, BNPP, BNP       Urine/Blood Cultures:  Results     Procedure Component Value Units Date/Time    CHLAMYDIA/GC PCR [[id number]] Collected: 02/02/21 2145    Order Status: Completed Specimen: Other Updated: 02/02/21 2227    COVID-19 RAPID TEST [[id number]] Collected: 02/02/21 2123    Order Status: Completed Specimen: Nasopharyngeal Updated: 02/02/21 2234     Specimen source Nasopharyngeal        COVID-19 rapid test Not detected        Comment: Rapid Abbott ID Now       Rapid NAAT:  The specimen is NEGATIVE for SARS-CoV-2, the novel coronavirus associated with COVID-19.       Negative results should be treated as presumptive  and, if inconsistent with clinical signs and symptoms or necessary for patient management, should be tested with an alternative molecular assay. Negative results do not preclude SARS-CoV-2 infection and should not be used as the sole basis for patient management decisions. This test has been authorized by the FDA under an Emergency Use Authorization (EUA) for use by authorized laboratories. Fact sheet for Healthcare Providers: ConventionFalcor Equine Enterprisesdate.co.nz  Fact sheet for Patients: [employer]date.co[employer]nz       Methodology: Isothermal Nucleic Acid Amplification         CULTURE, BLOOD [[id number]] Collected: 02/02/21 1743    Order Status: Completed Specimen: Blood Updated: 02/03/21 0746     Special Requests: NO SPECIAL REQUESTS        Culture result: NO GROWTH AFTER 10 HOURS       CULTURE, BLOOD [[id number]] Collected: 02/02/21 1743    Order Status: Completed Specimen: Blood Updated: 02/03/21 0746     Special Requests: NO SPECIAL REQUESTS        Culture result: NO GROWTH AFTER 10 HOURS       CULTURE, URINE [[id number]] Collected: 02/02/21 3567    Order Status: Completed Specimen: Urine from Clean catch Updated: 02/02/21 2115             IMAGING:  Ct Abd Pelv W Cont    Result Date: 2/2/2021  EXAM: CT ABD PELV W CONT INDICATION: Possible pyelo COMPARISON: No prior CTs available CONTRAST: 100 mL of Isovue-370. TECHNIQUE: Following the uneventful intravenous administration of contrast, thin axial images were obtained through the abdomen and pelvis. Coronal and sagittal reconstructions were generated. Oral contrast was not administered. CT dose reduction was achieved through use of a standardized protocol tailored for this examination and automatic exposure control for dose modulation. FINDINGS: LOWER THORAX: No significant abnormality in the incidentally imaged lower chest. LIVER: Small hypodensity in the left hepatic lobe too small to characterize.  BILIARY TREE: Gallbladder is within normal limits. CBD is not dilated. SPLEEN: within normal limits. PANCREAS: No mass or ductal dilatation. ADRENALS: Unremarkable. KIDNEYS: No mass or hydronephrosis. Simple cyst in the left kidney. Mild enhancement of the left renal pelvis. No hydroureter. STOMACH: Unremarkable. SMALL BOWEL: No dilatation or wall thickening. COLON: No dilatation or wall thickening. APPENDIX: Unremarkable PERITONEUM: No ascites or pneumoperitoneum. RETROPERITONEUM: Inflammatory changes in the left retroperitoneum REPRODUCTIVE ORGANS: Edematous prostate gland and seminal vesicles with inflammation around the seminal vesicles. URINARY BLADDER: No mass or calculus. BONES: No destructive bony lesions. Degenerative changes most pronounced at T12-L1 and L1-2 ABDOMINAL WALL: Prominent veins in the left inguinal canal. Large vessels and hyperemia of the left epididymis epididymis ADDITIONAL COMMENTS: N/A     1. Findings most likely representing prostatitis with involvement of the seminal vesicles. Inflammatory changes extending up the left retroperitoneum. 2.  There is mild enhancement of the left renal pelvis which may reflect underlying urinary tract infection. 3.  Engorged left testicular veins and hyperemia around the left epididymis possibly reflecting epididymitis. Us Scrotum/testicles    Result Date: 2/2/2021  EXAM: US SCROTUM/TESTICLES INDICATION: Left testicular pain. COMPARISON: None. TECHNIQUE: Real-time sonography of the scrotum was performed with a high frequency linear transducer. Multiple static images were obtained. Color Doppler evaluation was also performed. FINDINGS: RIGHT TESTICLE: The right testicle is normal in size and echotexture with normal color-flow. The right testis measures 5.5 x 3.6 x 2.4 cm and the right testicular volume is 24.6 mL.  RIGHT EPIDIDYMIS: The right epididymis is normal. LEFT TESTICLE: The left testicle is heterogeneous with increased vascular flow The left testis measures 4.9 x 2.6 x 3.3 cm and the left testicular volume is 22 mL. Small left hydrocele. LEFT EPIDIDYMIS: The left epididymis is enlarged with increased vascularity     1. Findings suspicious for left sided epididymoorchitis.            Signed By: Riky Phillips NP  - February 3, 2021

## 2021-02-03 NOTE — PROGRESS NOTES
[address] Dosing Services: Antimicrobial Stewardship Daily Doc 2/2/2021    Consult for antibiotic dosing of Vancomycin by Dr. Figueredo  Indication: Epididymitis versus enterocolitis, cUTI  Day of Therapy 1    Ht Readings from Last 1 Encounters:   02/02/21 172.7 cm (68\")        Wt Readings from Last 1 Encounters:   02/02/21 89.8 kg (198 lb)      Vancomycin therapy:  Current maintenance dose: Vancomycin 1750 mg loading dose in ER  Dose calculated to approximate a therapeutic trough of 15-20 mcg/mL. Last trough level: Initial dosing  Plan for level / Adjustment in Therapy: Scr 1.2 mg/dL, slightly above baseline but does not fit criteria for [place]. Patient s/p vancomycin 1750 mg loading dose will start 1000 mg Q12 hours empirically and plan for Css trough prior to 4th total dose. Calculated kinetics predict level ~15.8 mcg/ml with  on ordered dose. MD ordered daily BMP. Dose administration notes:   Doses given appropriately as scheduled    Date Dose & Interval Measured (mcg/mL) Extrapolated (mcg/mL)   ? ? ? ?   ? ? ? ?   ? ? ? ? Other Antimicrobial   (not dosed by pharmacist) Zosyn 3.375 mg Q8 hrs per attending MD  Levaquin 500 mg Q24 hours per attending MD   Cultures 2/2 Urine: Pending  2/2 Blood x 2: Pending   Serum Creatinine Lab Results   Component Value Date/Time    Creatinine 1.22 02/02/2021 03:39 PM         Creatinine Clearance Estimated Creatinine Clearance: 64.9 mL/min (based on SCr of 1.22 mg/dL).      Temp Temp: 98.8 °F (37.1 °C)       WBC Lab Results   Component Value Date/Time    WBC 40.2 (H) 02/02/2021 03:39 PM        H/H Lab Results   Component Value Date/Time    HGB 13.4 02/02/2021 03:39 PM        Platelets    Lab Results   Component Value Date/Time    PLATELET 672 05/36/7898 03:39 PM            Thanks,  Pharmacist Virginia Gilbert, PHARMD Contact information: [phone]

## 2021-02-03 NOTE — ROUTINE PROCESS
Bedside shift change report given to [facility], RN (oncoming nurse) by Queta Maurer RN (offgoing nurse). Report included the following information SBAR, Kardex, Intake/Output, MAR, Accordion, Recent Results, Med Rec Status and Cardiac Rhythm NSR.

## 2021-02-03 NOTE — ED NOTES
Verbal report given to NAIF BECERRA RN(name) on Julieth Boyd being transferred to 5th floor(unit) for routine progression of care    Report consisted of patient's Situation, Background, Assessment and Recommendations (SBAR)    Information from the following report(s)  SBAR, ED Summary, Intake/Output, MAR, Recent Results and Cardiac Rhythm Sinus tach was reviewed with the receiving nurse. Opportunity for questions and clarification was provided.     Patient transported with:  Monitor  Registered Nurse    Last Filed Values:  Temp: 98.3 °F (36.8 °C) (02/02/21 2137)  Pulse (Heart Rate): 100 (02/02/21 2137)  Resp Rate: 23 (02/02/21 2137)  O2 Sat (%): 97 % (02/02/21 2137)  BP: (!) 152/88 (02/02/21 2137)  MAP (Monitor): 103 (02/02/21 2130)  MAP (Calculated): 98 (02/02/21 1638)  Level of Consciousness: Alert (02/02/21 2137)      Lab Results   Component Value Date/Time    WBC 40.2 (H) 02/02/2021 03:39 PM    Lactic acid 1.3 02/02/2021 05:43 PM       Repeat LA:  Time Due none    Blood Cultures Drawn:  yes    Fluid Resuscitation:  Total needed 1000ml, Status completed, amount 1000    All Antibiotics Started:  yes, Dose Due none    VS x 2 post-fluid resuscitation:   yes    Vasopressor Infusion:  no none    Provider Reassessment needed and notified:  no , Due none    Additional Interventions/Comments:  None  Visit Vitals  BP (!) 152/88   Pulse 100   Temp 98.3 °F (36.8 °C)   Resp 23   Ht 5' 8\" (1.727 m)   Wt 89.8 kg (198 lb)   SpO2 97%   BMI 30.11 kg/m²

## 2021-02-04 ENCOUNTER — APPOINTMENT (OUTPATIENT)
Dept: GENERAL RADIOLOGY | Age: 66
DRG: 872 | End: 2021-02-04
Attending: STUDENT IN AN ORGANIZED HEALTH CARE EDUCATION/TRAINING PROGRAM
Payer: MEDICARE

## 2021-02-04 LAB
ANION GAP SERPL CALC-SCNC: 8 MMOL/L (ref 5–15)
BACTERIA SPEC CULT: ABNORMAL
BASOPHILS # BLD: 0 K/UL (ref 0–0.1)
BASOPHILS NFR BLD: 0 % (ref 0–1)
BUN SERPL-MCNC: 8 MG/DL (ref 6–20)
BUN/CREAT SERPL: 7 (ref 12–20)
CALCIUM SERPL-MCNC: 8.4 MG/DL (ref 8.5–10.1)
CAMPYLOBACTER SPECIES, DNA: NEGATIVE
CC UR VC: ABNORMAL
CHLORIDE SERPL-SCNC: 103 MMOL/L (ref 97–108)
CO2 SERPL-SCNC: 25 MMOL/L (ref 21–32)
CREAT SERPL-MCNC: 1.07 MG/DL (ref 0.7–1.3)
DATE LAST DOSE: ABNORMAL
DIFFERENTIAL METHOD BLD: ABNORMAL
ENTEROTOXIGEN E COLI, DNA: NEGATIVE
EOSINOPHIL # BLD: 0 K/UL (ref 0–0.4)
EOSINOPHIL NFR BLD: 0 % (ref 0–7)
ERYTHROCYTE [DISTWIDTH] IN BLOOD BY AUTOMATED COUNT: 12.6 % (ref 11.5–14.5)
GLUCOSE SERPL-MCNC: 113 MG/DL (ref 65–100)
HCT VFR BLD AUTO: 33.6 % (ref 36.6–50.3)
HGB BLD-MCNC: 11.2 G/DL (ref 12.1–17)
IMM GRANULOCYTES # BLD AUTO: 0.3 K/UL (ref 0–0.04)
IMM GRANULOCYTES NFR BLD AUTO: 1 % (ref 0–0.5)
LYMPHOCYTES # BLD: 2.2 K/UL (ref 0.8–3.5)
LYMPHOCYTES NFR BLD: 8 % (ref 12–49)
MAGNESIUM SERPL-MCNC: 2.3 MG/DL (ref 1.6–2.4)
MCH RBC QN AUTO: 29.7 PG (ref 26–34)
MCHC RBC AUTO-ENTMCNC: 33.3 G/DL (ref 30–36.5)
MCV RBC AUTO: 89.1 FL (ref 80–99)
MONOCYTES # BLD: 0.8 K/UL (ref 0–1)
MONOCYTES NFR BLD: 3 % (ref 5–13)
NEUTS SEG # BLD: 23.8 K/UL (ref 1.8–8)
NEUTS SEG NFR BLD: 88 % (ref 32–75)
NRBC # BLD: 0 K/UL (ref 0–0.01)
NRBC BLD-RTO: 0 PER 100 WBC
P SHIGELLOIDES DNA STL QL NAA+PROBE: NEGATIVE
PLATELET # BLD AUTO: 327 K/UL (ref 150–400)
PLATELET COMMENTS,PCOM: ABNORMAL
PMV BLD AUTO: 10 FL (ref 8.9–12.9)
POTASSIUM SERPL-SCNC: 3.1 MMOL/L (ref 3.5–5.1)
RBC # BLD AUTO: 3.77 M/UL (ref 4.1–5.7)
RBC MORPH BLD: ABNORMAL
REPORTED DOSE,DOSE: ABNORMAL UNITS
REPORTED DOSE/TIME,TMG: ABNORMAL
SALMONELLA SPECIES, DNA: NEGATIVE
SARS-COV-2, COV2: NORMAL
SERVICE CMNT-IMP: ABNORMAL
SHIGA TOXIN PRODUCING, DNA: NEGATIVE
SHIGELLA SP+EIEC IPAH STL QL NAA+PROBE: NEGATIVE
SODIUM SERPL-SCNC: 136 MMOL/L (ref 136–145)
VANCOMYCIN TROUGH SERPL-MCNC: 10.6 UG/ML (ref 5–10)
VIBRIO SPECIES, DNA: NEGATIVE
WBC # BLD AUTO: 27.1 K/UL (ref 4.1–11.1)
WBC MORPH BLD: ABNORMAL
Y. ENTEROCOLITICA, DNA: NEGATIVE

## 2021-02-04 PROCEDURE — 80202 ASSAY OF VANCOMYCIN: CPT

## 2021-02-04 PROCEDURE — 80048 BASIC METABOLIC PNL TOTAL CA: CPT

## 2021-02-04 PROCEDURE — 99232 SBSQ HOSP IP/OBS MODERATE 35: CPT | Performed by: FAMILY MEDICINE

## 2021-02-04 PROCEDURE — 89055 LEUKOCYTE ASSESSMENT FECAL: CPT

## 2021-02-04 PROCEDURE — 83735 ASSAY OF MAGNESIUM: CPT

## 2021-02-04 PROCEDURE — 94760 N-INVAS EAR/PLS OXIMETRY 1: CPT

## 2021-02-04 PROCEDURE — 87324 CLOSTRIDIUM AG IA: CPT

## 2021-02-04 PROCEDURE — 65660000000 HC RM CCU STEPDOWN

## 2021-02-04 PROCEDURE — 74011250636 HC RX REV CODE- 250/636: Performed by: STUDENT IN AN ORGANIZED HEALTH CARE EDUCATION/TRAINING PROGRAM

## 2021-02-04 PROCEDURE — 74011250637 HC RX REV CODE- 250/637: Performed by: STUDENT IN AN ORGANIZED HEALTH CARE EDUCATION/TRAINING PROGRAM

## 2021-02-04 PROCEDURE — 85025 COMPLETE CBC W/AUTO DIFF WBC: CPT

## 2021-02-04 PROCEDURE — 71045 X-RAY EXAM CHEST 1 VIEW: CPT

## 2021-02-04 PROCEDURE — 36415 COLL VENOUS BLD VENIPUNCTURE: CPT

## 2021-02-04 PROCEDURE — 74011000258 HC RX REV CODE- 258: Performed by: STUDENT IN AN ORGANIZED HEALTH CARE EDUCATION/TRAINING PROGRAM

## 2021-02-04 PROCEDURE — 74011250636 HC RX REV CODE- 250/636: Performed by: FAMILY MEDICINE

## 2021-02-04 PROCEDURE — 87506 IADNA-DNA/RNA PROBE TQ 6-11: CPT

## 2021-02-04 PROCEDURE — U0005 INFEC AGEN DETEC AMPLI PROBE: HCPCS

## 2021-02-04 RX ORDER — POTASSIUM CHLORIDE 750 MG/1
40 TABLET, FILM COATED, EXTENDED RELEASE ORAL
Status: COMPLETED | OUTPATIENT
Start: 2021-02-04 | End: 2021-02-04

## 2021-02-04 RX ADMIN — LEVOFLOXACIN 500 MG: 5 INJECTION, SOLUTION INTRAVENOUS at 20:50

## 2021-02-04 RX ADMIN — Medication 10 ML: at 14:00

## 2021-02-04 RX ADMIN — PIPERACILLIN AND TAZOBACTAM 3.38 G: 3; .375 INJECTION, POWDER, LYOPHILIZED, FOR SOLUTION INTRAVENOUS at 16:26

## 2021-02-04 RX ADMIN — SODIUM CHLORIDE 130 ML/HR: 9 INJECTION, SOLUTION INTRAVENOUS at 08:10

## 2021-02-04 RX ADMIN — VANCOMYCIN HYDROCHLORIDE 1000 MG: 1 INJECTION, POWDER, LYOPHILIZED, FOR SOLUTION INTRAVENOUS at 08:08

## 2021-02-04 RX ADMIN — ATORVASTATIN CALCIUM 10 MG: 20 TABLET, FILM COATED ORAL at 20:51

## 2021-02-04 RX ADMIN — ACETAMINOPHEN 650 MG: 325 TABLET ORAL at 09:38

## 2021-02-04 RX ADMIN — Medication 10 ML: at 20:51

## 2021-02-04 RX ADMIN — LISINOPRIL 40 MG: 20 TABLET ORAL at 20:51

## 2021-02-04 RX ADMIN — PIPERACILLIN AND TAZOBACTAM 3.38 G: 3; .375 INJECTION, POWDER, LYOPHILIZED, FOR SOLUTION INTRAVENOUS at 09:38

## 2021-02-04 RX ADMIN — PIPERACILLIN AND TAZOBACTAM 3.38 G: 3; .375 INJECTION, POWDER, LYOPHILIZED, FOR SOLUTION INTRAVENOUS at 00:20

## 2021-02-04 RX ADMIN — AMLODIPINE BESYLATE 10 MG: 5 TABLET ORAL at 20:50

## 2021-02-04 RX ADMIN — POTASSIUM CHLORIDE 40 MEQ: 750 TABLET, FILM COATED, EXTENDED RELEASE ORAL at 09:38

## 2021-02-04 RX ADMIN — ENOXAPARIN SODIUM 40 MG: 40 INJECTION SUBCUTANEOUS at 09:38

## 2021-02-04 NOTE — PROGRESS NOTES
2/4/21   CARE MANAGEMENT NOTE:  CM reviewed EMR and handoff received from previous  Zeke Bolivar). Pt was admitted with sepsis, UTI, HTN, and Covid PUI Rapid (negative 2/2). Reportedly, pt resides with his wife Estephania Madrid ([phone])    RUR 11%    Transition Plan of Care:  1. Urology following for medical management  2. Pt will return home with his wife; no anticipated post discharge needs at this writing  3. Outpt f/u  4. Pt will drive himself home from the hospital.    CM will continue to follow pt until discharged.   Mikaela

## 2021-02-04 NOTE — PROGRESS NOTES
Urology Progress Note    Patient: Misa Hou MRN: [id number]  SSN: [id number]    YOB: 1955  Age: 76 y.o. Sex: male        Assessment:     Misa Hou is a 76 y.o. male admitted to the hospital on 2/2/2021 for left scrotal pain, epididymitis, prostatitis, UTI. Plan:     1. Scrotal support  2. Pain management per primary team  3. Continue culture directed antibiotics. 4. Overall improving, continue to monitor for now. Subjective:     CC: Follow up for left scrotal pain    Interval History:  Still with left sided scrotal pain/tenderness without much improvement. Labs reviewed. WBC trending down, 27.1 from 36.9 yesterday. Tachycardic to 120s last night, better this am. No fevers.     ROS:  Review of Systems - History obtained from the patient  Respiratory ROS: positive for  - cough  Gastrointestinal ROS: positive for - abdominal pain  Genito-Urinary ROS: positive for - scrotal mass/pain    Objective:     Visit Vitals  BP (!) 136/91 (BP 1 Location: Right upper arm, BP Patient Position: At rest)   Pulse 95   Temp 97.5 °F (36.4 °C)   Resp 22   Ht 5' 8\" (1.727 m)   Wt 89.8 kg (198 lb)   SpO2 95%   BMI 30.11 kg/m²         Intake/Output Summary (Last 24 hours) at 2/4/2021 3929  Last data filed at 2/3/2021 2304  Gross per 24 hour   Intake 840 ml   Output 450 ml   Net 390 ml       Physical Exam  General: NAD  Respiratory: no distress, room air  Abdomen: soft tender left side  : left scrotal edema and tenderness  Neuro: Appropriate, no focal neurological deficits  Mood: appropriate    Labs reviewed, February 4, 2021  Recent Results (from the past 24 hour(s))   EKG, 12 LEAD, INITIAL    Collection Time: 02/03/21 11:25 AM   Result Value Ref Range    Ventricular Rate 89 BPM    Atrial Rate 89 BPM    P-R Interval 192 ms    QRS Duration 98 ms    Q-T Interval 364 ms    QTC Calculation (Bezet) 442 ms    Calculated P Axis 45 degrees    Calculated R Axis 9 degrees Calculated T Axis 15 degrees    Diagnosis       Normal sinus rhythm  Normal ECG  Confirmed by Charlotte Bobby ([id number]) on 2/3/2021 8:91:09 PM     METABOLIC PANEL, BASIC    Collection Time: 02/04/21  6:57 AM   Result Value Ref Range    Sodium 136 136 - 145 mmol/L    Potassium 3.1 (L) 3.5 - 5.1 mmol/L    Chloride 103 97 - 108 mmol/L    CO2 25 21 - 32 mmol/L    Anion gap 8 5 - 15 mmol/L    Glucose 113 (H) 65 - 100 mg/dL    BUN 8 6 - 20 MG/DL    Creatinine 1.07 0.70 - 1.30 MG/DL    BUN/Creatinine ratio 7 (L) 12 - 20      GFR est AA >60 >60 ml/min/1.73m2    GFR est non-AA >60 >60 ml/min/1.73m2    Calcium 8.4 (L) 8.5 - 10.1 MG/DL   CBC WITH AUTOMATED DIFF    Collection Time: 02/04/21  6:57 AM   Result Value Ref Range    WBC 27.1 (H) 4.1 - 11.1 K/uL    RBC 3.77 (L) 4.10 - 5.70 M/uL    HGB 11.2 (L) 12.1 - 17.0 g/dL    HCT 33.6 (L) 36.6 - 50.3 %    MCV 89.1 80.0 - 99.0 FL    MCH 29.7 26.0 - 34.0 PG    MCHC 33.3 30.0 - 36.5 g/dL    RDW 12.6 11.5 - 14.5 %    PLATELET 044 270 - 108 K/uL    MPV 10.0 8.9 - 12.9 FL    NRBC 0.0 0  WBC    ABSOLUTE NRBC 0.00 0.00 - 0.01 K/uL    NEUTROPHILS PENDING %    LYMPHOCYTES PENDING %    MONOCYTES PENDING %    EOSINOPHILS PENDING %    BASOPHILS PENDING %    IMMATURE GRANULOCYTES PENDING %    ABS. NEUTROPHILS PENDING K/UL    ABS. LYMPHOCYTES PENDING K/UL    ABS. MONOCYTES PENDING K/UL    ABS. EOSINOPHILS PENDING K/UL    ABS. BASOPHILS PENDING K/UL    ABS. IMM. GRANS. PENDING K/UL    DF PENDING    VANCOMYCIN, TROUGH    Collection Time: 02/04/21  6:57 AM   Result Value Ref Range    Vancomycin,trough 10.6 (H) 5.0 - 10.0 ug/mL    Reported dose date NOT PROVIDED      Reported dose time: NOT PROVIDED      Reported dose: NOT PROVIDED UNITS       Imaging:  CT Results  (Last 48 hours)               02/02/21 2109  CT ABD PELV W CONT Final result    Impression:  1. Findings most likely representing prostatitis with involvement of the   seminal vesicles.  Inflammatory changes extending up the left retroperitoneum. 2.  There is mild enhancement of the left renal pelvis which may reflect   underlying urinary tract infection. 3.  Engorged left testicular veins and hyperemia around the left epididymis   possibly reflecting epididymitis. Narrative:  EXAM: CT ABD PELV W CONT       INDICATION: Possible pyelo       COMPARISON: No prior CTs available        CONTRAST: 100 mL of Isovue-370. TECHNIQUE:    Following the uneventful intravenous administration of contrast, thin axial   images were obtained through the abdomen and pelvis. Coronal and sagittal   reconstructions were generated. Oral contrast was not administered. CT dose   reduction was achieved through use of a standardized protocol tailored for this   examination and automatic exposure control for dose modulation. FINDINGS:    LOWER THORAX: No significant abnormality in the incidentally imaged lower chest.   LIVER: Small hypodensity in the left hepatic lobe too small to characterize. BILIARY TREE: Gallbladder is within normal limits. CBD is not dilated. SPLEEN: within normal limits. PANCREAS: No mass or ductal dilatation. ADRENALS: Unremarkable. KIDNEYS: No mass or hydronephrosis. Simple cyst in the left kidney. Mild   enhancement of the left renal pelvis. No hydroureter. STOMACH: Unremarkable. SMALL BOWEL: No dilatation or wall thickening. COLON: No dilatation or wall thickening. APPENDIX: Unremarkable   PERITONEUM: No ascites or pneumoperitoneum. RETROPERITONEUM: Inflammatory changes in the left retroperitoneum   REPRODUCTIVE ORGANS: Edematous prostate gland and seminal vesicles with   inflammation around the seminal vesicles. URINARY BLADDER: No mass or calculus. BONES: No destructive bony lesions.  Degenerative changes most pronounced at   T12-L1 and L1-2   ABDOMINAL WALL: Prominent veins in the left inguinal canal. Large vessels and   hyperemia of the left epididymis epididymis   ADDITIONAL COMMENTS: N/A                 Signed By: Opal Aguilar NP - February 4, 2021

## 2021-02-04 NOTE — ROUTINE PROCESS
2303-Pt HR increased to 140's ST. Pt up to BR when nurses entered room. Pt assisted back to bed and vitals obtained (see flowsheets). While in the room pt was noted to be sneezing, coughing, SOB, and febrile (temp-99.3 F). Family practice notified and ordered COVID PCR. Pt apparently had rapid COVID swab and COVID PCR ordered and obtained 2/2/21. Rapid COVID test resulted negative, but PCR has not resulted yet. Nursing supervisor notified and stated to wait until COVID PCR results before re-testing. Pt placed in COVID isolation at this time. No further complaints at this time. Will continue to monitor. Verbal shift change report given to [email or web address], RN (oncoming nurse) by Dyanna Gitelman, RN and Jose Randolph RN (offgoing nurse). Report included the following information SBAR, Kardex, MAR and Recent Results.

## 2021-02-04 NOTE — PROGRESS NOTES
[address] [place] [address]   Office [phone]  Fax [phone]          Assessment and Plan     Marimar Hugo is a 76 y.o. male  with a PMHx of  HTN, HLD, prediabetes  who is admitted for sepsis. 24 Hour Events: NAEON       Sepsis 2/2 epididymitis & UTI: POA 3/4 SIRs criteria (HR, RR, WBC). POA WBC 40.2, UA w/ mod LE, mod blood, 4+ bacteria, >100 WBC, lactic acid 1.3. US scrotum L epididymoorchitis. CT abd/pelv w/prostatitis, L epididymitis, enhancement of L renal pelvis. GC/CT neg. EKG sinus with PVCs. S/p Vanc.   -Continue  zosyn, levaquin  - F/u UCx G- Rods  - F/u BCxNTD  -Urology consulted, appreciate recs  -Daily CBC, BMP  -EKG pending    Loose stools:  - F/u Stool studies     Covid PUI: Rapid Covid negative. - F/u COVID PCR     Elevated creatinine: RESOLVED with fluids. POA Cr 1.22 (b/l 1.0). Hypertension: BP on admission 162/95.   -Continue home amlodipine 10mg daily, lisinopril 40mg daily  -Will continue to monitor at this time and readjust as BP's trend     HLD: Lipid panel 2020 Tchol 133 HDL 37, LDL 73, tri 113.   -Continue home lipitor 10mg     Prediabetes: A1C 2020 5.7. Pt has been attempting management w/ dietary and lifestyle modifications.         FEN/GI - DM diet. NS at 130 mL/hr. Activity - Ambulate as tolerated  DVT prophylaxis - Lovenox  GI prophylaxis - Not indicated at this time  Fall prophylaxis - Not indicated at this time. Code Status - Full. Discussed with patient / caregivers. Next of Kin Name and Nobie Cornelius- [phone]    Son Romero MD  Family Medicine Resident         Subjective / Objective     Subjective: Afebrile and hemodynamically stable. Concerns include slight SOB. Denies chest pain, abdominal pain.     Objective:  Temp (24hrs), Av.2 °F (36.8 °C), Min:97.5 °F (36.4 °C), Max:99.3 °F (37.4 °C)     Visit Vitals  BP (!) 136/91 (BP 1 Location: Right upper arm, BP Patient Position: At rest)   Pulse 95   Temp 97.5 °F (36.4 °C)   Resp 22   Ht 5' 8\" (1.727 m)   Wt 198 lb (89.8 kg)   SpO2 95%   BMI 30.11 kg/m²     Physical Exam: Performed by Dr. Salinas Goldie: No acute distress. Respiratory: CTAB. No w/r/r/c.   Cardiovascular: Normal S1,S2. No m/r/g.   GI: Nondistended.+ bowel sounds. Nontender. No rebound tenderness or guarding. : L testicle w/ mild erythema and swelling, TTP. +Prehn sign. Extremities: No LE edema. Skin: Warm, dry. No rashes. Neuro: Alert and oriented    Respiratory:   O2 Device: Room air     I/O:  Date 02/03/21 0700 - 02/04/21 0659 02/04/21 0700 - 02/05/21 0659   Shift 6922-9803 2676-9021 24 Hour Total 9898-6347 8232-4718 24 Hour Total   INTAKE   P.O.  240 240        P. O.  240 240      I. V.(mL/kg/hr)  600(0.6) 600(0.3)        Volume (levoFLOXacin (LEVAQUIN) 500 mg in D5W IVPB)  100 100        Volume (vancomycin (VANCOCIN) 1,000 mg in 0.9% sodium chloride 250 mL (VIAL-MATE))  500 500      Shift Total(mL/kg)  840(9.4) 840(9.4)      OUTPUT   Urine(mL/kg/hr) 450(0.4)  450(0.2)        Urine Voided 450  450        Urine Occurrence(s) 201 x 1 x 202 x      Emesis/NG output           Emesis Occurrence(s) 0 x  0 x      Stool           Stool Occurrence(s) 1 x 1 x 2 x      Shift Total(mL/kg) 450(5)  450(5)      NET -450 840 390      Weight (kg) 89.8 89.8 89.8 89.8 89.8 89.8       Inpatient Medications  Current Facility-Administered Medications   Medication Dose Route Frequency    sodium chloride (NS) flush 5-40 mL  5-40 mL IntraVENous Q8H    sodium chloride (NS) flush 5-40 mL  5-40 mL IntraVENous PRN    acetaminophen (TYLENOL) tablet 650 mg  650 mg Oral Q6H PRN    Or    acetaminophen (TYLENOL) suppository 650 mg  650 mg Rectal Q6H PRN    polyethylene glycol (MIRALAX) packet 17 g  17 g Oral DAILY PRN    promethazine (PHENERGAN) tablet 12.5 mg  12.5 mg Oral Q6H PRN    Or    ondansetron (ZOFRAN) injection 4 mg  4 mg IntraVENous Q6H PRN    enoxaparin (LOVENOX) injection 40 mg  40 mg SubCUTAneous DAILY    0.9% sodium chloride infusion  130 mL/hr IntraVENous CONTINUOUS    piperacillin-tazobactam (ZOSYN) 3.375 g in 0.9% sodium chloride (MBP/ADV) 100 mL MBP  3.375 g IntraVENous Q8H    amLODIPine (NORVASC) tablet 10 mg  10 mg Oral QHS    atorvastatin (LIPITOR) tablet 10 mg  10 mg Oral QHS    lisinopriL (PRINIVIL, ZESTRIL) tablet 40 mg  40 mg Oral QHS    levoFLOXacin (LEVAQUIN) 500 mg in D5W IVPB  500 mg IntraVENous Q24H         Allergies  No Known Allergies      CBC:  Recent Labs     02/04/21  0657 02/03/21  0454 02/02/21  1539   WBC 27.1* 36.9* 40.2*   HGB 11.2* 11.2* 13.4   HCT 33.6* 33.9* 39.5    320 178       Metabolic Panel:  Recent Labs     02/04/21  0657 02/03/21  0454 02/02/21  1539    136 136   K 3.1* 3.0* 3.5    105 103   CO2 25 22 25   BUN 8 13 14   CREA 1.07 0.92 1.22   * 110* 122*   CA 8.4* 8.7 9.2   ALB  --   --  3.2*   ALT  --   --  13         Imaging/Diagnostic Studies:  No results found for this or any previous visit. Results from [place] encounter on 02/02/21   US SCROTUM/TESTICLES    Narrative EXAM: US SCROTUM/TESTICLES     INDICATION: Left testicular pain. COMPARISON: None. TECHNIQUE:  Real-time sonography of the scrotum was performed with a high frequency linear  transducer. Multiple static images were obtained. Color Doppler evaluation was  also performed. FINDINGS:  RIGHT TESTICLE: The right testicle is normal in size and echotexture with normal  color-flow. The right testis measures 5.5 x 3.6 x 2.4 cm and the right  testicular volume is 24.6 mL. RIGHT EPIDIDYMIS: The right epididymis is normal.    LEFT TESTICLE: The left testicle is heterogeneous with increased vascular flow  The left testis measures 4.9 x 2.6 x 3.3 cm and the left testicular volume is 22  mL. Small left hydrocele. LEFT EPIDIDYMIS: The left epididymis is enlarged with increased vascularity      Impression 1.   Findings suspicious for left sided epididymoorchitis. Results from [place] encounter on 02/02/21   CT ABD PELV W CONT    Narrative EXAM: CT ABD PELV W CONT    INDICATION: Possible pyelo    COMPARISON: No prior CTs available     CONTRAST: 100 mL of Isovue-370. TECHNIQUE:   Following the uneventful intravenous administration of contrast, thin axial  images were obtained through the abdomen and pelvis. Coronal and sagittal  reconstructions were generated. Oral contrast was not administered. CT dose  reduction was achieved through use of a standardized protocol tailored for this  examination and automatic exposure control for dose modulation. FINDINGS:   LOWER THORAX: No significant abnormality in the incidentally imaged lower chest.  LIVER: Small hypodensity in the left hepatic lobe too small to characterize. BILIARY TREE: Gallbladder is within normal limits. CBD is not dilated. SPLEEN: within normal limits. PANCREAS: No mass or ductal dilatation. ADRENALS: Unremarkable. KIDNEYS: No mass or hydronephrosis. Simple cyst in the left kidney. Mild  enhancement of the left renal pelvis. No hydroureter. STOMACH: Unremarkable. SMALL BOWEL: No dilatation or wall thickening. COLON: No dilatation or wall thickening. APPENDIX: Unremarkable  PERITONEUM: No ascites or pneumoperitoneum. RETROPERITONEUM: Inflammatory changes in the left retroperitoneum  REPRODUCTIVE ORGANS: Edematous prostate gland and seminal vesicles with  inflammation around the seminal vesicles. URINARY BLADDER: No mass or calculus. BONES: No destructive bony lesions. Degenerative changes most pronounced at  T12-L1 and L1-2  ABDOMINAL WALL: Prominent veins in the left inguinal canal. Large vessels and  hyperemia of the left epididymis epididymis  ADDITIONAL COMMENTS: N/A      Impression 1. Findings most likely representing prostatitis with involvement of the  seminal vesicles. Inflammatory changes extending up the left retroperitoneum.     2.  There is mild enhancement of the left renal pelvis which may reflect  underlying urinary tract infection. 3.  Engorged left testicular veins and hyperemia around the left epididymis  possibly reflecting epididymitis.           For Billing    Chief Complaint   Patient presents with   [facility] Abdominal Pain       Hospital Problems  Date Reviewed: 9/9/2020          Codes Class Noted POA    Complicated UTI (urinary tract infection) ICD-10-CM: N39.0  ICD-9-CM: 599.0  2/2/2021 Unknown

## 2021-02-05 VITALS
WEIGHT: 198 LBS | DIASTOLIC BLOOD PRESSURE: 99 MMHG | BODY MASS INDEX: 30.01 KG/M2 | TEMPERATURE: 99.6 F | HEART RATE: 85 BPM | SYSTOLIC BLOOD PRESSURE: 144 MMHG | RESPIRATION RATE: 18 BRPM | HEIGHT: 68 IN | OXYGEN SATURATION: 96 %

## 2021-02-05 DIAGNOSIS — I10 ESSENTIAL HYPERTENSION: ICD-10-CM

## 2021-02-05 DIAGNOSIS — N39.0 COMPLICATED UTI (URINARY TRACT INFECTION): ICD-10-CM

## 2021-02-05 DIAGNOSIS — R73.03 PREDIABETES: ICD-10-CM

## 2021-02-05 DIAGNOSIS — A04.72 C. DIFFICILE COLITIS: ICD-10-CM

## 2021-02-05 DIAGNOSIS — N45.3 EPIDIDYMOORCHITIS: ICD-10-CM

## 2021-02-05 PROBLEM — A41.9 SEPSIS (HCC): Status: ACTIVE | Noted: 2021-02-05

## 2021-02-05 LAB
ANION GAP SERPL CALC-SCNC: 11 MMOL/L (ref 5–15)
BASOPHILS # BLD: 0 K/UL (ref 0–0.1)
BASOPHILS NFR BLD: 0 % (ref 0–1)
BUN SERPL-MCNC: 7 MG/DL (ref 6–20)
BUN/CREAT SERPL: 9 (ref 12–20)
CALCIUM SERPL-MCNC: 7.2 MG/DL (ref 8.5–10.1)
CHLORIDE SERPL-SCNC: 106 MMOL/L (ref 97–108)
CO2 SERPL-SCNC: 20 MMOL/L (ref 21–32)
CREAT SERPL-MCNC: 0.75 MG/DL (ref 0.7–1.3)
DIFFERENTIAL METHOD BLD: ABNORMAL
EOSINOPHIL # BLD: 0 K/UL (ref 0–0.4)
EOSINOPHIL NFR BLD: 0 % (ref 0–7)
ERYTHROCYTE [DISTWIDTH] IN BLOOD BY AUTOMATED COUNT: 12.6 % (ref 11.5–14.5)
GLUCOSE SERPL-MCNC: 88 MG/DL (ref 65–100)
HCT VFR BLD AUTO: 36.5 % (ref 36.6–50.3)
HGB BLD-MCNC: 12.3 G/DL (ref 12.1–17)
IMM GRANULOCYTES # BLD AUTO: 0.1 K/UL (ref 0–0.04)
IMM GRANULOCYTES NFR BLD AUTO: 1 % (ref 0–0.5)
LYMPHOCYTES # BLD: 1.2 K/UL (ref 0.8–3.5)
LYMPHOCYTES NFR BLD: 12 % (ref 12–49)
MCH RBC QN AUTO: 29.3 PG (ref 26–34)
MCHC RBC AUTO-ENTMCNC: 33.7 G/DL (ref 30–36.5)
MCV RBC AUTO: 86.9 FL (ref 80–99)
MONOCYTES # BLD: 0.8 K/UL (ref 0–1)
MONOCYTES NFR BLD: 8 % (ref 5–13)
NEUTS SEG # BLD: 7.7 K/UL (ref 1.8–8)
NEUTS SEG NFR BLD: 79 % (ref 32–75)
NRBC # BLD: 0 K/UL (ref 0–0.01)
NRBC BLD-RTO: 0 PER 100 WBC
PLATELET # BLD AUTO: 257 K/UL (ref 150–400)
PMV BLD AUTO: 10.4 FL (ref 8.9–12.9)
POTASSIUM SERPL-SCNC: 3 MMOL/L (ref 3.5–5.1)
RBC # BLD AUTO: 4.2 M/UL (ref 4.1–5.7)
SARS-COV-2, XPLCVT: NOT DETECTED
SODIUM SERPL-SCNC: 137 MMOL/L (ref 136–145)
SOURCE, COVRS: NORMAL
WBC # BLD AUTO: 9.8 K/UL (ref 4.1–11.1)
WBC #/AREA STL HPF: NORMAL /HPF (ref 0–4)

## 2021-02-05 PROCEDURE — 74011250636 HC RX REV CODE- 250/636: Performed by: STUDENT IN AN ORGANIZED HEALTH CARE EDUCATION/TRAINING PROGRAM

## 2021-02-05 PROCEDURE — 36415 COLL VENOUS BLD VENIPUNCTURE: CPT

## 2021-02-05 PROCEDURE — 80048 BASIC METABOLIC PNL TOTAL CA: CPT

## 2021-02-05 PROCEDURE — 74011250637 HC RX REV CODE- 250/637: Performed by: STUDENT IN AN ORGANIZED HEALTH CARE EDUCATION/TRAINING PROGRAM

## 2021-02-05 PROCEDURE — 85025 COMPLETE CBC W/AUTO DIFF WBC: CPT

## 2021-02-05 PROCEDURE — 94760 N-INVAS EAR/PLS OXIMETRY 1: CPT

## 2021-02-05 PROCEDURE — 74011000258 HC RX REV CODE- 258: Performed by: STUDENT IN AN ORGANIZED HEALTH CARE EDUCATION/TRAINING PROGRAM

## 2021-02-05 PROCEDURE — 99238 HOSP IP/OBS DSCHRG MGMT 30/<: CPT | Performed by: FAMILY MEDICINE

## 2021-02-05 RX ORDER — LEVOFLOXACIN 500 MG/1
500 TABLET, FILM COATED ORAL DAILY
Qty: 7 TAB | Refills: 0 | Status: SHIPPED | OUTPATIENT
Start: 2021-02-05 | End: 2021-02-12

## 2021-02-05 RX ORDER — VANCOMYCIN HYDROCHLORIDE 250 MG/5ML
125 POWDER, FOR SOLUTION ORAL EVERY 6 HOURS
Status: DISCONTINUED | OUTPATIENT
Start: 2021-02-05 | End: 2021-02-05 | Stop reason: HOSPADM

## 2021-02-05 RX ORDER — VANCOMYCIN HYDROCHLORIDE 250 MG/5ML
125 POWDER, FOR SOLUTION ORAL EVERY 6 HOURS
Qty: 97.5 ML | Refills: 0 | Status: SHIPPED | OUTPATIENT
Start: 2021-02-05 | End: 2021-02-15

## 2021-02-05 RX ORDER — POTASSIUM CHLORIDE 750 MG/1
40 TABLET, FILM COATED, EXTENDED RELEASE ORAL 2 TIMES DAILY
Status: DISCONTINUED | OUTPATIENT
Start: 2021-02-05 | End: 2021-02-05 | Stop reason: HOSPADM

## 2021-02-05 RX ORDER — VANCOMYCIN HYDROCHLORIDE 250 MG/5ML
125 POWDER, FOR SOLUTION ORAL EVERY 6 HOURS
Qty: 100 ML | Refills: 0 | Status: SHIPPED
Start: 2021-02-05 | End: 2021-02-05 | Stop reason: SDUPTHER

## 2021-02-05 RX ORDER — LEVOFLOXACIN 500 MG/1
500 TABLET, FILM COATED ORAL DAILY
Qty: 10 TAB | Refills: 0 | Status: SHIPPED | OUTPATIENT
Start: 2021-02-05 | End: 2021-02-05 | Stop reason: SDUPTHER

## 2021-02-05 RX ADMIN — Medication 10 ML: at 14:00

## 2021-02-05 RX ADMIN — SODIUM CHLORIDE 130 ML/HR: 9 INJECTION, SOLUTION INTRAVENOUS at 01:15

## 2021-02-05 RX ADMIN — VANCOMYCIN HYDROCHLORIDE 125 MG: 250 POWDER, FOR SOLUTION ORAL at 11:33

## 2021-02-05 RX ADMIN — ENOXAPARIN SODIUM 40 MG: 40 INJECTION SUBCUTANEOUS at 08:23

## 2021-02-05 RX ADMIN — Medication 10 ML: at 06:05

## 2021-02-05 RX ADMIN — PIPERACILLIN AND TAZOBACTAM 3.38 G: 3; .375 INJECTION, POWDER, LYOPHILIZED, FOR SOLUTION INTRAVENOUS at 01:15

## 2021-02-05 RX ADMIN — POTASSIUM CHLORIDE 40 MEQ: 750 TABLET, FILM COATED, EXTENDED RELEASE ORAL at 11:33

## 2021-02-05 NOTE — DISCHARGE INSTRUCTIONS
HOME DISCHARGE INSTRUCTIONS    Jimbo Alvarez / [id number] : 1955    Admission date: 2021 Discharge date: 2021 9:54 PM     Please bring this form with you to show your care provider at your follow-up appointment. Primary care provider:  Sumeet Wiggins DO    Discharging provider:  Emely Voss MD  - Family Medicine Resident  Dee Dee Huerta MD - Family Medicine Attending      You have been admitted to the hospital with the following diagnoses:    ACUTE DIAGNOSES:  Complicated UTI (urinary tract infection) [N39.0]   C.diff diarrhea  Epididymmoorthicits (Swelling of testes and tube attached to testes)  . . . . . . . . . . . . . . . . . . . . . . . . . . . . . . . . . . . . . . . . . . . . . . . . . . . . . . . . . . . . . . . . . . . . . . . .   You are well enough to be discharged from the hospital. However, because you were inpatient in a hospital, you are at greater risk of having been exposed to the coronavirus. PLEASE stay inside and self-quarantine for 14 days to prevent further spread of the coronavirus. . . . . . . . . . . . . . . . . . . . . . . . . . . . . . . . . . . . . . . . . . . . . . . . . . . . . . . . . . . . . . . . . . . . . . . . .     You were hospitalized for an injection of your testes and found to have a urinary tract infection. You were treated with antibiotics. You were found to have Cdiff diarrhea, a type of infection that can occur after taking antibiotics. Please continue taking the prescribed antibiotics after hospital discharge. Please follow up with your PCP and urology doctor after hospitalization. . . . . . . . . . . . . . . . . . . . . . . . . . . . . . . . . . . . . . . . . . . . . . . . . . . . . . . . . . . . . . . . . . . . . . . . Viola Gonzalez      FOLLOW-UP CARE RECOMMENDATIONS:     Appointments  Follow-up Information     Follow up With Specialties Details Why [facility]  On 2021 Dr. Terri Ferreira at 1:30pm 1 [facility] Dr Romario Mares, DO Family Medicine On 2/9/2021 Virtual appt for hospital follow up at 1:15pm  [address] [place] [address] Kiesha [id number] [phone]               Follow-up tests needed: None     Pending test results: At the time of your discharge the following test results are still pending: Blood cultures (negative to date - normal), COVID PCR test.   Please make sure you review these results with your outpatient follow-up provider(s). DIET/what to eat:  Diabetic Diet    ACTIVITY:  Activity as tolerated     Wound care: Keep testes elevated    Equipment needed:  None    Specific symptoms to watch for: chest pain, shortness of breath, fever, chills, nausea, vomiting, diarrhea, change in mentation, falling, weakness, bleeding. What to do if new or unexpected symptoms occur? If you experience any of the above symptoms (or should other concerns or questions arise after discharge) please call your primary care physician. Return to the emergency room if you cannot get hold of your doctor. · It is very important that you keep your follow-up appointment(s). · Please bring discharge papers, medication list (and/or medication bottles) to your follow-up appointments for review by your outpatient provider(s). · Please check the list of medications and be sure it includes every medication (even non-prescription medications) that your provider wants you to take. · It is important that you take the medication exactly as they are prescribed. · Keep your medication in the bottles provided by the pharmacist and keep a list of the medication names, dosages, and times to be taken in your wallet. · Do not take other medications without consulting your doctor.    · If you have any questions about your medications or other instructions, please talk to your nurse or care provider before you leave the hospital.     Information obtained by:     I understand that if any problems occur once I am at home I am to contact my physician. These instructions were explained to me and I had the opportunity to ask questions. I understand and acknowledge receipt of the instructions indicated above. Physician's or R.N.'s Signature                                                                  Date/Time                                                                                                                                              Patient or Representative Signature                                                          Date/Time        Patient Education        Epididymitis and Orchitis: Care Instructions  Your Care Instructions     Epididymitis is pain and swelling of the tube that is attached to each testicle. This tube is called the epididymis. Orchitis is pain and swelling of the testicle. Infection with bacteria often causes these conditions. Sexually transmitted infections (STIs) also can cause both conditions. This is often the case in men younger than 28. Other causes in boys and older men are infections from surgery or having a catheter that drains urine. The mumps virus also can cause orchitis. Anti-inflammatory or pain medicines can help with the pain. Antibiotics are used if the problem is caused by bacteria. They are not used if a virus is the cause. Your testicle may stay swollen for many days or even a few weeks. The doctor has checked you carefully, but problems can develop later. If you notice any problems or new symptoms, get medical treatment right away. Follow-up care is a key part of your treatment and safety. Be sure to make and go to all appointments, and call your doctor if you are having problems. It's also a good idea to know your test results and keep a list of the medicines you take.   How can you care for yourself at home? · If your doctor prescribed antibiotics, take them as directed. Do not stop taking them just because you feel better. You need to take the full course of antibiotics. · Ask your doctor if you can take an over-the-counter pain medicine, such as acetaminophen (Tylenol), ibuprofen (Advil, Motrin), or naproxen (Aleve). Be safe with medicines. Read and follow all instructions on the label. · Limit your activity to what is comfortable. · Wear snug underwear or an athletic supporter. This can help reduce pain. · Apply either cold or heat to the swollen area. Use the one that works best for your pain. Sitting in a warm bath for 15 minutes twice a day will help reduce the swelling more quickly. · If you have been told that an STI may have caused your condition, do not have sex until your doctor says it is safe. This will prevent spreading the infection. Tell your sex partner or partners that they need to be checked. They may need treatment. When should you call for help? Call your doctor now or seek immediate medical care if:    · Your pain gets worse.     · You have a new or higher fever.     · You have new or more swelling of your testicle.     · You have new belly pain, or your pain gets worse. Watch closely for changes in your health, and be sure to contact your doctor if:    · You do not get better as expected. Where can you learn more? Go to http://www.gray.com/  Enter S360 in the search box to learn more about \"Epididymitis and Orchitis: Care Instructions. \"  Current as of: June 29, 2020               Content Version: 12.6  © 0878-6931 [employer]. Care instructions adapted under license by [employer] (which disclaims liability or warranty for this information).  If you have questions about a medical condition or this instruction, always ask your healthcare professional. [address] any warranty or liability for your use of this information. Patient Education        Clostridium Difficile Colitis: Care Instructions  Your Care Instructions     Clostridium difficile (also called C. difficile) are bacteria that can cause swelling and irritation of the large intestine, or colon. This inflammation is also called colitis. It can cause diarrhea, fever, and belly cramps. You may get C. difficile colitis if you take antibiotics. The infection is most common in people who are taking antibiotics while in the hospital. It is also common in older people in hospitals and nursing homes. Severe disease could cause the colon to swell to many times its normal size (toxic megacolon). This can cause death and needs emergency treatment. You may have a swollen belly that is painful or tender, a rapid heartbeat, and a fever. Follow-up care is a key part of your treatment and safety. Be sure to make and go to all appointments, and call your doctor if you are having problems. It's also a good idea to know your test results and keep a list of the medicines you take. How can you care for yourself at home? · Your doctor may give you antibiotics to treat C. difficile colitis. If your doctor prescribes an antibiotic, he or she will give you a different antibiotic than the one that caused your infection. Take your antibiotics as directed. Do not stop taking them just because you feel better. You need to take the full course of antibiotics. · To prevent dehydration, drink plenty of fluids, enough so that your urine is light yellow or clear like water. Choose water and other caffeine-free clear liquids until you feel better. If you have kidney, heart, or liver disease and have to limit fluids, talk with your doctor before you increase the amount of fluids you drink. · Begin eating small amounts of mild foods, if you feel like it. Try yogurt that has live cultures of lactobacillus (check the label). ?  Avoid spicy foods, fruits, alcohol, and caffeine until 48 hours after all symptoms go away. ? Avoid chewing gum that contains sorbitol. ? Avoid dairy products (except for yogurt with lactobacillus) while you have diarrhea and for 3 days after symptoms go away. · To prevent the spread of C. difficile, practice good hygiene. Keep your hands clean by washing them well and often with soap and clean, running water. This is most important after you use the bathroom and before you make and eat food. Remind everyone in your home to also wash their hands often. Alcohol-based hand sanitizers do not kill C. difficile. · After the diarrhea is better, you are much less likely to spread C. difficile. But you still need to take extra care to keep your home clean. ? Clean bathroom surfaces with a bleach solution to kill any C. difficile spores. To dilute household bleach, follow the directions on the label. When should you call for help? Call 911 if:     · You passed out (lost consciousness). Call your doctor now or seek immediate medical care if:    · You have a fever over 101°F or shaking chills.     · You feel lightheaded or have a fast heart rate.     · You pass stools that are almost always bloody.     · You have signs of needing more fluids. You have sunken eyes and a dry mouth, and you pass only a little dark urine.     · You have severe belly pain with or without bloating.     · You have severe vomiting and cannot keep down liquids.     · You are not passing any stools or gas. Watch closely for changes in your health, and be sure to contact your doctor if:    · You do not get better as expected. Where can you learn more? Go to http://www.gray.com/  Enter H511 in the search box to learn more about \"Clostridium Difficile Colitis: Care Instructions. \"  Current as of: February 11, 2020               Content Version: 12.6  © 0740-1022 [employer].    Care instructions adapted under license by [employer] (which disclaims liability or warranty for this information). If you have questions about a medical condition or this instruction, always ask your healthcare professional. [address] any warranty or liability for your use of this information.

## 2021-02-05 NOTE — PROGRESS NOTES
Bedside and Verbal shift change report given to BETHANY Sood (oncoming nurse) by Miquel ALLEN (offgoing nurse). Report included the following information SBAR, Kardex, Intake/Output, MAR and Recent Results.

## 2021-02-05 NOTE — PROGRESS NOTES
Comprehensive Nutrition Assessment    Type and Reason for Visit: Initial, RD nutrition re-screen/LOS    Nutrition Recommendations/Plan:   1. Continue consistent carb, no concentrated sweets diet. Nutrition Assessment:      2/5: 75 y/o male admitted with complicated UTI. PMH includes HTN. Pt COVID-19 r/o, spoke with pt via phone. Pt reports appetite was poor since Sunday but is getting better now. Says he ate one slice of Estonian toast, 100% peaches and milk for breakfast this AM. Says he normally eats well at home. Denies any recent weight changes. No c/o N/V. He has no nutrition-questions at this time. D/c order noted for today. Labs- K 3.0, Ca 7.2. Meds- KCl. Intakes:  Patient Vitals for the past 168 hrs:   % Diet Eaten   02/05/21 0820 75 %   02/05/21 0339 0 %   02/04/21 0800 80 %     Weight hx: Wt Readings from Last 10 Encounters:   02/02/21 89.8 kg (198 lb)   09/09/20 89.8 kg (198 lb)   08/18/20 88.9 kg (196 lb)   01/22/20 90.3 kg (199 lb)   05/11/12 85.1 kg (187 lb 9.6 oz)   04/06/12 84.8 kg (187 lb)     Malnutrition Assessment:  Malnutrition Status: none    Estimated Daily Nutrient Needs:  Energy (kcal): 2274(5615 x 1.3 AF); Weight Used for Energy Requirements: Current  Protein (g): 90(1-1.2 g/kg); Weight Used for Protein Requirements: Current  Fluid (ml/day): 2147; Method Used for Fluid Requirements: 1 ml/kcal      Nutrition Related Findings:  LBM 2/4      Wounds:    None       Current Nutrition Therapies:  DIET DIABETIC CONSISTENT CARB Regular; No Conc. Sweets    Anthropometric Measures:  · Height:  5' 8\" (172.7 cm)  · Current Body Wt:  89.8 kg (198 lb)   · Admission Body Wt:       · Usual Body Wt:        · Ideal Body Wt:  154 lbs:  128.6 %   · Adjusted Body Weight:   ; Weight Adjustment for: No adjustment   · Adjusted BMI:       · BMI Category:  Obese class 1 (BMI 30.0-34. 9)       Nutrition Diagnosis:   · No nutrition diagnosis at this time related to   as evidenced by        Nutrition Interventions: Food and/or Nutrient Delivery: Continue current diet  Nutrition Education and Counseling: No recommendations at this time  Coordination of Nutrition Care: Continue to monitor while inpatient    Goals:  PO intake >50% meals next 3-5 days       Nutrition Monitoring and Evaluation:   Behavioral-Environmental Outcomes: None identified  Food/Nutrient Intake Outcomes: Food and nutrient intake  Physical Signs/Symptoms Outcomes: Weight, Biochemical data    Discharge Planning:    Continue current diet     Electronically signed by Aure Miller RDN on 2/5/2021 at 12:23 PM    Contact: [phone]

## 2021-02-05 NOTE — PROGRESS NOTES
2/5/21   CARE MANAGEMENT NOTE:  CM reviewed EMR and handoff received from previous  Pranay Pedroza). Pt was admitted with sepsis, UTI, HTN, and Covid PUI Rapid (negative 2/2). Reportedly, pt resides with his wife Vida Benitez ([phone])     RUR 11%     Transition Plan of Care:  1. Urology following for medical management  2. Pt will return home with his wife; no anticipated post discharge needs at this writing  3. Outpt f/u  4. Pt will drive himself home from the hospital.     No further post discharge needs identified at this writing.   Mikaela

## 2021-02-05 NOTE — DISCHARGE SUMMARY
[facility] [facility] [address] [place] [address]   Office [phone]  Fax [phone]       Discharge / Transfer / Off-Service Note     Name: Blanca Cardenas MRN: [id number]  Sex: Male   YOB: 1955  Age: 76 y.o. PCP: Erica Cheema DO     Date of admission: 2/2/2021  Date of discharge/transfer: 2/4/2021    Attending physician at admission: Dr. Jen Wren    Attending physician at discharge/transfer: Dr. Ulysses Cower    Resident physician at discharge/transfer: Carlos Skelton MD, PGY1     Consultants during hospitalization  Urology     Admission diagnoses   Complicated UTI (urinary tract infection) [N39.0]    Recommended follow-up after discharge  1. PCP   2. Urology    Things to follow up on with PCP:  Sepsis 2/2 epidymoorchitis & UTI  COVID PUI  C. Diff colitis  HTN  HLD  PreDM     Medication Changes:  1. New Medications: Levquin 500mg daily, Vancomycin 125mg po QID  2. Modified Medications: None  3. Discontinued Medications: None    History of Present Illness  Per admitting provider,     Blanca Cardenas is a 76 y.o. male with PMHx of HTN, HLD, prediabetes who presents to the ED complaining of LLQ abd pain and L testicular pain. Pt states he has been experiencing L testicular pain, LLQ abd pain, and dysuria x3 days. He has also had nausea and vomiting during that time w/ complaints of some diarrhea. The pain acutely worsened today and he went to [facility] where he was found to have an elevated WBC, at which point he presented straight to the ED.      Pt denies HA, SOB, CP, fever, chills, hematuria, hematochezia. He has been eating and drinking OK and denies reduced urine output. HOSPITAL COURSE   Sepsis 2/2 epididymoorchitis & UTI: POA 3/4 SIRs criteria (HR, RR, WBC). US scrotum revealed L epididymoorchitis. CT abd/pelv concerning for prostatitis, L epididymitis, enhancement of L renal pelvis. GC/CT neg.   Initially started on Vanc/Zosyn/Levaquin and therapy was deescalated to Levaquin. UCx grew E.Coli.sensitive to [email or web address]. BCx negative to date and pending at discharge. SIRS criteria resolved at discharge.   -Continue Levaquin 500mg daily  - PCP: f/u BCx     C.diff colitis: 5-6 loose stools POA. Enteric panel neg.  - Start Vanc 125mg po QID   - F/u PCP    Covid PUI: Rapid Covid negative. COVID PCR pending at discharge. - PCP: f/u COVID PCR     Elevated creatinine: RESOLVED with fluids. POA Cr 1.22 (b/l 1.0). Hypertension: POA /95. DC /99.   -Continue home amlodipine 10mg daily, lisinopril 40mg daily  -F/u PCP     HLD: Lipid panel 8/2020 Tchol 133 HDL 37, LDL 73, tri 113.   -Continue home lipitor 10mg     Prediabetes: A1C 8/2020 5.7. Pt has been attempting management w/ dietary and lifestyle modifications. -F/u PCP      Physical exam at discharge: Performed by Dr. Kody Harrison  BP (!) 153/83 (BP 1 Location: Right upper arm, BP Patient Position: At rest)   Pulse 94   Temp 99.5 °F (37.5 °C)   Resp 22   Ht 5' 8\" (1.727 m)   Wt 198 lb (89.8 kg)   SpO2 94%   BMI 30.11 kg/m²         General Oriented to person, place, and time and well-developed. Appears well-nourished, no distress. Not diaphoretic. HENT Head Normocephalic and atraumatic. Eyes Conjunctivae are normal, no discharge. No scleral icterus. Cardio Normal rate, regular rhythm. Exam reveals no gallop and no friction rub. No murmur heard. No chest wall tenderness. Pulmonary Effort normal and breath sounds normal. No respiratory distress. No wheezes, no rales. Abdominal Soft. Bowel sounds normal. No distension. No tenderness.  Deferred   Extremities No edema of lower extremities. No tenderness. Distal pulses intact. Neurological Alert and oriented to person, place, and time. Dermatology Skin is warm and dry. No rash noted. No erythema or pallor. Psychiatric Affect and judgment normal.        Condition at discharge: Stable.     Labs  Recent Labs     02/04/21  1615 02/03/21  3258 02/02/21  1539   WBC 27.1* 36.9* 40.2*   HGB 11.2* 11.2* 13.4   HCT 33.6* 33.9* 39.5    320 370     Recent Labs     02/04/21  0657 02/03/21  0454 02/02/21  1539    136 136   K 3.1* 3.0* 3.5    105 103   CO2 25 22 25   BUN 8 13 14   CREA 1.07 0.92 1.22   * 110* 122*   CA 8.4* 8.7 9.2   MG 2.3  --   --      Recent Labs     02/02/21  1539   ALT 13      TBILI 0.9   TP 8.6*   ALB 3.2*   GLOB 5.4*   LPSE 83     No results for input(s): PH, PCO2, PO2, TNIPOC, TROIQ, INR, PTP, APTT, FE, TIBC, PSAT, FERR, GLUCPOC, INREXT in the last 72 hours. No lab exists for component: GLPOC  Cultures  · 2/2 UCx Ecoli, 2/2 BCxNTD     Procedures / Diagnostic Studies  · None    Imaging  Results from Hospital Encounter encounter on 02/02/21   XR CHEST PORT    Narrative INDICATION:  SOB     Exam: Portable chest 1052. Comparison: None. Findings: Cardiomediastinal silhouette is within normal limits. Pulmonary  vasculature is not engorged. There are no focal parenchymal opacities,  effusions, or pneumothorax. Impression 1. No acute disease            Results from Hospital Encounter encounter on 02/02/21   US SCROTUM/TESTICLES    Narrative EXAM: US SCROTUM/TESTICLES     INDICATION: Left testicular pain. COMPARISON: None. TECHNIQUE:  Real-time sonography of the scrotum was performed with a high frequency linear  transducer. Multiple static images were obtained. Color Doppler evaluation was  also performed. FINDINGS:  RIGHT TESTICLE: The right testicle is normal in size and echotexture with normal  color-flow. The right testis measures 5.5 x 3.6 x 2.4 cm and the right  testicular volume is 24.6 mL. RIGHT EPIDIDYMIS: The right epididymis is normal.    LEFT TESTICLE: The left testicle is heterogeneous with increased vascular flow  The left testis measures 4.9 x 2.6 x 3.3 cm and the left testicular volume is 22  mL. Small left hydrocele.     LEFT EPIDIDYMIS: The left epididymis is enlarged with increased vascularity      Impression 1. Findings suspicious for left sided epididymoorchitis. Results from [place] encounter on 02/02/21   CT ABD PELV W CONT    Narrative EXAM: CT ABD PELV W CONT    INDICATION: Possible pyelo    COMPARISON: No prior CTs available     CONTRAST: 100 mL of Isovue-370. TECHNIQUE:   Following the uneventful intravenous administration of contrast, thin axial  images were obtained through the abdomen and pelvis. Coronal and sagittal  reconstructions were generated. Oral contrast was not administered. CT dose  reduction was achieved through use of a standardized protocol tailored for this  examination and automatic exposure control for dose modulation. FINDINGS:   LOWER THORAX: No significant abnormality in the incidentally imaged lower chest.  LIVER: Small hypodensity in the left hepatic lobe too small to characterize. BILIARY TREE: Gallbladder is within normal limits. CBD is not dilated. SPLEEN: within normal limits. PANCREAS: No mass or ductal dilatation. ADRENALS: Unremarkable. KIDNEYS: No mass or hydronephrosis. Simple cyst in the left kidney. Mild  enhancement of the left renal pelvis. No hydroureter. STOMACH: Unremarkable. SMALL BOWEL: No dilatation or wall thickening. COLON: No dilatation or wall thickening. APPENDIX: Unremarkable  PERITONEUM: No ascites or pneumoperitoneum. RETROPERITONEUM: Inflammatory changes in the left retroperitoneum  REPRODUCTIVE ORGANS: Edematous prostate gland and seminal vesicles with  inflammation around the seminal vesicles. URINARY BLADDER: No mass or calculus. BONES: No destructive bony lesions. Degenerative changes most pronounced at  T12-L1 and L1-2  ABDOMINAL WALL: Prominent veins in the left inguinal canal. Large vessels and  hyperemia of the left epididymis epididymis  ADDITIONAL COMMENTS: N/A      Impression 1. Findings most likely representing prostatitis with involvement of the  seminal vesicles. Inflammatory changes extending up the left retroperitoneum. 2.  There is mild enhancement of the left renal pelvis which may reflect  underlying urinary tract infection. 3.  Engorged left testicular veins and hyperemia around the left epididymis  possibly reflecting epididymitis. Chronic diagnoses   Problem List as of 2/4/2021 Date Reviewed: 9/9/2020          Codes Class Noted - Resolved    Complicated UTI (urinary tract infection) ICD-10-CM: N39.0  ICD-9-CM: 599.0  2/2/2021 - Present        HTN (hypertension) ICD-10-CM: I10  ICD-9-CM: 401.9  4/6/2012 - Present              Discharge/Transfer Medications  Current Discharge Medication List           Diet:  Diabetic diet.     Activity:  As tolerated    Disposition:      Discharge instructions to patient/family  Please seek medical attention for any new or worsening symptoms particularly fever, chest pain, shortness of breath, abdominal pain, nausea, vomiting    Follow up plans/appointments  Follow-up Information    None          Fatuma Curiel MD  Family Medicine Resident       For Billing    Chief Complaint   Patient presents with   [facility] [place] Abdominal Pain       Hospital Problems  Date Reviewed: 9/9/2020          Codes Class Noted POA    Complicated UTI (urinary tract infection) ICD-10-CM: N39.0  ICD-9-CM: 599.0  2/2/2021 Unknown

## 2021-02-05 NOTE — ROUTINE PROCESS
I have reviewed discharge instructions with the patient. The patient verbalized understanding. Paper copy of AVS given to patient. Prescriptions called into preferred pharmacy for pickup. Patient e-signed AVS in computer. PIV removed x2 with tip intact. Patient did not require assistance getting dressed or gathering belongings. Patient left via wheelchair to personal vehicle.

## 2021-02-05 NOTE — PROGRESS NOTES
Urology Progress Note    Patient: Shon Ramon MRN: [id number]  SSN: [id number]    YOB: 1955  Age: 76 y.o. Sex: male        Assessment:     Shon Ramon is a 76 y.o. male admitted to the hospital on 2/2/2021 for left scrotal pain, epididymitis, prostatitis, UTI. Plan:     1. Scrotal support  2. Pain management per primary team  3. Continue culture directed antibiotics. 4. Overall improving and no intervention planned. 5. Outpatient follow up scheduled for 2/18/2021. Will sign off. Please call if needed. Subjective:     CC: Follow up for left scrotal pain    Interval History:  Still with left sided scrotal pain/tenderness without much improvement. Labs reviewed. WBC trending down, 27.1 from 36.9 yesterday. Tachycardic to 120s last night, better this am. No fevers.     ROS:  Review of Systems - History obtained from the patient  Respiratory ROS: positive for  - cough  Gastrointestinal ROS: positive for - abdominal pain  Genito-Urinary ROS: positive for - scrotal mass/pain    Objective:     Visit Vitals  BP (!) 153/89 (BP 1 Location: Left upper arm, BP Patient Position: At rest)   Pulse 88   Temp 98.9 °F (37.2 °C)   Resp 19   Ht 5' 8\" (1.727 m)   Wt 89.8 kg (198 lb)   SpO2 93%   BMI 30.11 kg/m²         Intake/Output Summary (Last 24 hours) at 2/5/2021 0857  Last data filed at 2/5/2021 0700  Gross per 24 hour   Intake 2504 ml   Output 750 ml   Net 1754 ml       Physical Exam  General: NAD  Respiratory: no distress, room air  Abdomen: not tender  : left scrotal edema and tenderness significantly improved  Neuro: Appropriate, no focal neurological deficits  Mood: appropriate    Labs reviewed, February 5, 2021  Recent Results (from the past 24 hour(s))   SARS-COV-2    Collection Time: 02/04/21  9:41 AM   Result Value Ref Range    SARS-CoV-2 Please find results under separate order     WBC, STOOL    Collection Time: 02/04/21 12:44 PM   Result Value Ref Range    White blood cells, stool 0 TO 4 0 - 4 /HPF   ENTERIC BACTERIA PANEL, DNA    Collection Time: 02/04/21 12:44 PM   Result Value Ref Range    Shigella species, DNA Negative NEG      Campylobacter species, DNA Negative NEG      Vibrio species, DNA Negative NEG      Enterotoxigen E Coli, DNA Negative NEG      Shiga toxin producing, DNA Negative NEG      Salmonella species, DNA Negative NEG      P. shigelloides, DNA Negative NEG      Y. enterocolitica, DNA Negative NEG     C. DIFFICILE AG & TOXIN A/B    Collection Time: 02/04/21 12:44 PM   Result Value Ref Range    GDH ANTIGEN Positive (A) NEG      C. difficile toxin Positive (A) NEG      PCR Reflex PENDING     INTERPRETATION POSITIVE FOR TOXIGENIC C. DIFFICILE (AA) NTXCD      Clinical Consideration PENDING    METABOLIC PANEL, BASIC    Collection Time: 02/05/21  6:39 AM   Result Value Ref Range    Sodium 137 136 - 145 mmol/L    Potassium 3.0 (L) 3.5 - 5.1 mmol/L    Chloride 106 97 - 108 mmol/L    CO2 20 (L) 21 - 32 mmol/L    Anion gap 11 5 - 15 mmol/L    Glucose 88 65 - 100 mg/dL    BUN 7 6 - 20 MG/DL    Creatinine 0.75 0.70 - 1.30 MG/DL    BUN/Creatinine ratio 9 (L) 12 - 20      GFR est AA >60 >60 ml/min/1.73m2    GFR est non-AA >60 >60 ml/min/1.73m2    Calcium 7.2 (L) 8.5 - 10.1 MG/DL   CBC WITH AUTOMATED DIFF    Collection Time: 02/05/21  6:39 AM   Result Value Ref Range    WBC 9.8 4.1 - 11.1 K/uL    RBC 4.20 4. 10 - 5.70 M/uL    HGB 12.3 12.1 - 17.0 g/dL    HCT 36.5 (L) 36.6 - 50.3 %    MCV 86.9 80.0 - 99.0 FL    MCH 29.3 26.0 - 34.0 PG    MCHC 33.7 30.0 - 36.5 g/dL    RDW 12.6 11.5 - 14.5 %    PLATELET 323 965 - 990 K/uL    MPV 10.4 8.9 - 12.9 FL    NRBC 0.0 0  WBC    ABSOLUTE NRBC 0.00 0.00 - 0.01 K/uL    NEUTROPHILS 79 (H) 32 - 75 %    LYMPHOCYTES 12 12 - 49 %    MONOCYTES 8 5 - 13 %    EOSINOPHILS 0 0 - 7 %    BASOPHILS 0 0 - 1 %    IMMATURE GRANULOCYTES 1 (H) 0.0 - 0.5 %    ABS. NEUTROPHILS 7.7 1.8 - 8.0 K/UL    ABS.  LYMPHOCYTES 1.2 0.8 - 3.5 K/UL    ABS. MONOCYTES 0.8 0.0 - 1.0 K/UL    ABS. EOSINOPHILS 0.0 0.0 - 0.4 K/UL    ABS. BASOPHILS 0.0 0.0 - 0.1 K/UL    ABS. IMM.  GRANS. 0.1 (H) 0.00 - 0.04 K/UL    DF AUTOMATED         Imaging:  CT Results  (Last 48 hours)    None          Signed By: Holland Chopra NP - February 5, 2021

## 2021-02-05 NOTE — PROGRESS NOTES
[facility] [address] Office [phone] Fax [phone] Assessment and Plan Tran Parham is a 76 y.o. male  with a PMHx of  HTN, HLD, prediabetes  who is admitted for sepsis. 24 Hour Events: [facility] Sepsis 2/2 epididymoorchitis & UTI: POA 3/4 SIRs criteria (HR, RR, WBC). POA WBC 40.2, UA w/ mod LE, mod blood, 4+ bacteria, >100 WBC, lactic acid 1.3. US scrotum L epididymoorchitis. CT abd/pelv w/prostatitis, L epididymitis, enhancement of L renal pelvis. GC/CT neg. EKG sinus with PVCs. S/p Vanc & Zosyn. UCx pansensitive E.Coli. -Continue levaquin - F/u BCxNTD 
-Urology consulted, appreciate recs 
-Daily CBC, BMP Covid PUI: Rapid Covid negative. 2/4 CXR no acute disease.  
- F/u COVID PCR  
 
C.diff: 5-6 loose stools POA. Enteric panel neg. 
- Vanc 125mg po QID  
- F/u stool WBC Elevated creatinine: RESOLVED with fluids. POA Cr 1.22 (b/l 1.0). Hypertension: BP on admission 162/95.  
-Continue home amlodipine 10mg daily, lisinopril 40mg daily 
-Will continue to monitor at this time and readjust as BP's trend 
  
HLD: Lipid panel 2020 Tchol 133 HDL 37, LDL 73, tri 113.  
-Continue home lipitor 10mg 
  
Prediabetes: A1C 2020 5.7. Pt has been attempting management w/ dietary and lifestyle modifications. 
 
 
  
FEN/GI - DM diet. NS at 130 mL/hr. Activity - Ambulate as tolerated DVT prophylaxis - Lovenox GI prophylaxis - Not indicated at this time Fall prophylaxis - Not indicated at this time. Code Status - Full. Discussed with patient / caregivers. Next of Dave [address] Name and Roosvelt Nissen- [phone] Carmela Venegas MD 
Family Medicine Resident Subjective / Objective Subjective: Reports feeling better today. Less scrotal pain. Denies SOB, CP, Abd pain. Objective: 
Temp (24hrs), Av.7 °F (37.1 °C), Min:97.5 °F (36.4 °C), Max:99.5 °F (37.5 °C) Visit Vitals BP (!) 153/83 (BP 1 Location: Right upper arm, BP Patient Position: At rest) Pulse 94 Temp 99.5 °F (37.5 °C) Resp 22 Ht 5' 8\" (1.727 m) Wt 198 lb (89.8 kg) SpO2 94% BMI 30.11 kg/m² Physical Exam: Performed by Dr. Hermes Leiva General: No acute distress. Respiratory: CTAB. No w/r/r/c.  
Cardiovascular: Normal S1,S2. No m/r/g. GI: Nondistended.+ bowel sounds. Nontender. No rebound tenderness or guarding. Extremities: No LE edema. Skin: Warm, dry. No rashes. Neuro: Alert and oriented Respiratory:   O2 Device: Room air I/O: 
Date 02/03/21 1900 - 02/04/21 8033 02/04/21 0700 - 02/05/21 3354 Shift 9291-2922 24 Hour Total 4880-4194 4393-4733 24 Hour Total  
INTAKE  
P.O. 240 240 200  200  
  P. O. 240 240 200  200  
I. V.(mL/kg/hr) 600 600 Volume (levoFLOXacin (LEVAQUIN) 500 mg in D5W IVPB) 100 100 Volume (vancomycin (VANCOCIN) 1,000 mg in 0.9% sodium chloride 250 mL (VIAL-MATE)) 500 500 Shift Total(mL/kg) 840(9.4) 840(9.4) [id number])  200(2.2) OUTPUT Urine(mL/kg/hr)  450 300(0.3)  300 Urine Voided  450 300  300 Urine Occurrence(s) 1 x 202 x 1 x  1 x Emesis/NG output Emesis Occurrence(s)  0 x 0 x  0 x Stool Stool Occurrence(s) 1 x 2 x 1 x  1 x Shift Total(mL/kg)  450(5) 300(3.3)  300(3.3)  390 -100  -100 Weight (kg) 89.8 89.8 89.8 89.8 89.8 Inpatient Medications Current Facility-Administered Medications Medication Dose Route Frequency  sodium chloride (NS) flush 5-40 mL  5-40 mL IntraVENous Q8H  
 sodium chloride (NS) flush 5-40 mL  5-40 mL IntraVENous PRN  
 acetaminophen (TYLENOL) tablet 650 mg  650 mg Oral Q6H PRN Or  
 acetaminophen (TYLENOL) suppository 650 mg  650 mg Rectal Q6H PRN  polyethylene glycol (MIRALAX) packet 17 g  17 g Oral DAILY PRN  promethazine (PHENERGAN) tablet 12.5 mg  12.5 mg Oral Q6H PRN  Or  
 ondansetron (ZOFRAN) injection 4 mg  4 mg IntraVENous Q6H PRN  
  enoxaparin (LOVENOX) injection 40 mg  40 mg SubCUTAneous DAILY  0.9% sodium chloride infusion  130 mL/hr IntraVENous CONTINUOUS  piperacillin-tazobactam (ZOSYN) 3.375 g in 0.9% sodium chloride (MBP/ADV) 100 mL MBP  3.375 g IntraVENous Q8H  
 amLODIPine (NORVASC) tablet 10 mg  10 mg Oral QHS  atorvastatin (LIPITOR) tablet 10 mg  10 mg Oral QHS  lisinopriL (PRINIVIL, ZESTRIL) tablet 40 mg  40 mg Oral QHS  levoFLOXacin (LEVAQUIN) 500 mg in D5W IVPB  500 mg IntraVENous Q24H Allergies No Known Allergies CBC: 
Recent Labs 02/04/21 
0953 02/03/21 
0454 02/02/21 [address] [place] WBC 27.1* 36.9* 40.2* HGB 11.2* 11.2* 13.4 HCT 33.6* 33.9* 39.5  320 370 Metabolic Panel: 
Recent Labs 02/04/21 
4303 02/03/21 
0454 02/02/21 [address] [place]  136 136  
K 3.1* 3.0* 3.5  105 103 CO2 25 22 25 BUN 8 13 14 CREA 1.07 0.92 1.22  
* 110* 122* CA 8.4* 8.7 9.2 MG 2.3  --   --   
ALB  --   --  3.2* ALT  --   --  13 Imaging/Diagnostic Studies: 
[place] [facility] [place] XR CHEST PORT Narrative INDICATION:  SOB Exam: Portable chest 1052. Comparison: None. Findings: Cardiomediastinal silhouette is within normal limits. Pulmonary 
vasculature is not engorged. There are no focal parenchymal opacities, 
effusions, or pneumothorax. Impression 1. No acute disease Results from [facility] [place] on 02/02/21 US SCROTUM/TESTICLES Narrative EXAM: US SCROTUM/TESTICLES  
 
INDICATION: Left testicular pain. COMPARISON: None. TECHNIQUE: 
Real-time sonography of the scrotum was performed with a high frequency linear 
transducer. Multiple static images were obtained. Color Doppler evaluation was 
also performed. FINDINGS: 
RIGHT TESTICLE: The right testicle is normal in size and echotexture with normal 
color-flow. The right testis measures 5.5 x 3.6 x 2.4 cm and the right testicular volume is 24.6 mL. RIGHT EPIDIDYMIS: The right epididymis is normal. 
 
LEFT TESTICLE: The left testicle is heterogeneous with increased vascular flow The left testis measures 4.9 x 2.6 x 3.3 cm and the left testicular volume is 22 
mL. Small left hydrocele. LEFT EPIDIDYMIS: The left epididymis is enlarged with increased vascularity Impression 1. Findings suspicious for left sided epididymoorchitis. [place] [facility] [place] CT ABD PELV W CONT Narrative EXAM: CT ABD PELV W CONT INDICATION: Possible pyelo COMPARISON: No prior CTs available CONTRAST: 100 mL of Isovue-370. TECHNIQUE:  
Following the uneventful intravenous administration of contrast, thin axial 
images were obtained through the abdomen and pelvis. Coronal and sagittal 
reconstructions were generated. Oral contrast was not administered. CT dose 
reduction was achieved through use of a standardized protocol tailored for this 
examination and automatic exposure control for dose modulation. FINDINGS:  
LOWER THORAX: No significant abnormality in the incidentally imaged lower chest. 
LIVER: Small hypodensity in the left hepatic lobe too small to characterize. BILIARY TREE: Gallbladder is within normal limits. CBD is not dilated. SPLEEN: within normal limits. PANCREAS: No mass or ductal dilatation. ADRENALS: Unremarkable. KIDNEYS: No mass or hydronephrosis. Simple cyst in the left kidney. Mild 
enhancement of the left renal pelvis. No hydroureter. STOMACH: Unremarkable. SMALL BOWEL: No dilatation or wall thickening. COLON: No dilatation or wall thickening. APPENDIX: Unremarkable PERITONEUM: No ascites or pneumoperitoneum. RETROPERITONEUM: Inflammatory changes in the left retroperitoneum REPRODUCTIVE ORGANS: Edematous prostate gland and seminal vesicles with 
inflammation around the seminal vesicles. URINARY BLADDER: No mass or calculus. BONES: No destructive bony lesions. Degenerative changes most pronounced at 
T12-L1 and L1-2 ABDOMINAL WALL: Prominent veins in the left inguinal canal. Large vessels and 
hyperemia of the left epididymis epididymis ADDITIONAL COMMENTS: N/A Impression 1. Findings most likely representing prostatitis with involvement of the 
seminal vesicles. Inflammatory changes extending up the left retroperitoneum. 2.  There is mild enhancement of the left renal pelvis which may reflect 
underlying urinary tract infection. 3.  Engorged left testicular veins and hyperemia around the left epididymis 
possibly reflecting epididymitis. For Billing Chief Complaint Patient presents with  Abdominal Pain Hospital Problems  Date Reviewed: 9/9/2020 Codes Class Noted POA Complicated UTI (urinary tract infection) ICD-10-CM: N39.0 ICD-9-CM: 599.0  2/2/2021 Unknown

## 2021-02-05 NOTE — PROGRESS NOTES
Bedside and Verbal shift change report given to Carmen (oncoming nurse) by Tigist Jeffrey (offgoing nurse). Report included the following information SBAR, Kardex and MAR.

## 2021-02-06 LAB
C DIFF GDH STL QL: POSITIVE
C DIFF TOX A+B STL QL IA: POSITIVE
INTERPRETATION: ABNORMAL

## 2021-02-08 ENCOUNTER — PATIENT OUTREACH (OUTPATIENT)
Dept: CASE MANAGEMENT | Age: 66
End: 2021-02-08

## 2021-02-08 LAB
BACTERIA SPEC CULT: NORMAL
BACTERIA SPEC CULT: NORMAL
SERVICE CMNT-IMP: NORMAL
SERVICE CMNT-IMP: NORMAL

## 2021-02-09 ENCOUNTER — TELEPHONE (OUTPATIENT)
Dept: FAMILY MEDICINE CLINIC | Age: 66
End: 2021-02-09

## 2021-02-09 ENCOUNTER — VIRTUAL VISIT (OUTPATIENT)
Dept: FAMILY MEDICINE CLINIC | Age: 66
End: 2021-02-09
Payer: MEDICARE

## 2021-02-09 DIAGNOSIS — A04.72 C. DIFFICILE COLITIS: ICD-10-CM

## 2021-02-09 DIAGNOSIS — N45.3 EPIDIDYMOORCHITIS: Primary | ICD-10-CM

## 2021-02-09 PROCEDURE — G8428 CUR MEDS NOT DOCUMENT: HCPCS | Performed by: FAMILY MEDICINE

## 2021-02-09 PROCEDURE — 99495 TRANSJ CARE MGMT MOD F2F 14D: CPT | Performed by: FAMILY MEDICINE

## 2021-02-09 NOTE — PATIENT INSTRUCTIONS
A Healthy Lifestyle: Care Instructions Your Care Instructions A healthy lifestyle can help you feel good, stay at a healthy weight, and have plenty of energy for both work and play. A healthy lifestyle is something you can share with your whole family. A healthy lifestyle also can lower your risk for serious health problems, such as high blood pressure, heart disease, and diabetes. You can follow a few steps listed below to improve your health and the health of your family. Follow-up care is a key part of your treatment and safety. Be sure to make and go to all appointments, and call your doctor if you are having problems. It's also a good idea to know your test results and keep a list of the medicines you take. How can you care for yourself at home? · Do not eat too much sugar, fat, or fast foods. You can still have dessert and treats now and then. The goal is moderation. · Start small to improve your eating habits. Pay attention to portion sizes, drink less juice and soda pop, and eat more fruits and vegetables. ? Eat a healthy amount of food. A 3-ounce serving of meat, for example, is about the size of a deck of cards. Fill the rest of your plate with vegetables and whole grains. ? Limit the amount of soda and sports drinks you have every day. Drink more water when you are thirsty. ? Eat at least 5 servings of fruits and vegetables every day. It may seem like a lot, but it is not hard to reach this goal. A serving or helping is 1 piece of fruit, 1 cup of vegetables, or 2 cups of leafy, raw vegetables. Have an apple or some carrot sticks as an afternoon snack instead of a candy bar.  Try to have fruits and/or vegetables at every meal. 
 · Make exercise part of your daily routine. You may want to start with simple activities, such as walking, bicycling, or slow swimming. Try to be active 30 to 60 minutes every day. You do not need to do all 30 to 60 minutes all at once. For example, you can exercise 3 times a day for 10 or 20 minutes. Moderate exercise is safe for most people, but it is always a good idea to talk to your doctor before starting an exercise program. 
· Keep moving. Lucho Duquered the lawn, work in the garden, or [employer]. Take the stairs instead of the elevator at work. · If you smoke, quit. People who smoke have an increased risk for heart attack, stroke, cancer, and other lung illnesses. Quitting is hard, but there are ways to boost your chance of quitting tobacco for good. ? Use nicotine gum, patches, or lozenges. ? Ask your doctor about stop-smoking programs and medicines. ? Keep trying. In addition to reducing your risk of diseases in the future, you will notice some benefits soon after you stop using tobacco. If you have shortness of breath or asthma symptoms, they will likely get better within a few weeks after you quit. · Limit how much alcohol you drink. Moderate amounts of alcohol (up to 2 drinks a day for men, 1 drink a day for women) are okay. But drinking too much can lead to liver problems, high blood pressure, and other health problems. Family health If you have a family, there are many things you can do together to improve your health. · Eat meals together as a family as often as possible. · Eat healthy foods. This includes fruits, vegetables, lean meats and dairy, and whole grains. · Include your family in your fitness plan. Most people think of activities such as jogging or tennis as the way to fitness, but there are many ways you and your family can be more active. Anything that makes you breathe hard and gets your heart pumping is exercise. Here are some tips: ? Walk to do errands or to take your child to school or the bus. 
? Go for a family bike ride after dinner instead of watching TV. Where can you learn more? Go to http://www.gray.com/ Enter X547 in the search box to learn more about \"A Healthy Lifestyle: Care Instructions. \" Current as of: January 31, 2020               Content Version: 12.6 © 2006-2020 [employer]. Care instructions adapted under license by [employer] (which disclaims liability or warranty for this information). If you have questions about a medical condition or this instruction, always ask your healthcare professional. [address] any warranty or liability for your use of this information.

## 2021-02-09 NOTE — TELEPHONE ENCOUNTER
----- Message from Steve Hernandez sent at 2/9/2021  1:17 PM EST -----  Regarding: Dr Heredia Master 1  General Message/Vendor Calls    Caller's first and last name: Radha Salazar      Reason for call: VV apt today at 1:15pm      Callback required yes/no and why: yes to conduct vv appt      Best contact number(s)::[phone]      Details to clarify the request: Pt is requesting a call back and advised he retrurng eva to conduct vv scheduled for 1:15 pm today      Steve Hernandez

## 2021-02-09 NOTE — PROGRESS NOTES
Progress Note    he is a 76y.o. year old male who presents for evalution. Subjective:     Here for follow-up from hospital for sepsis. Currently on Levaquin 500 mg daily tolerating this fine. Patient was found C. difficile diarrhea and is on vancomycin oral.. He states feeling better overall. Diarrhea Has improved. Testicular pain is improving but not completely gone but significantly better than it was. Reviewed PmHx, RxHx, FmHx, SocHx, AllgHx and updated and dated in the chart. Review of Systems - negative except as listed above in the HPI    Objective: There were no vitals filed for this visit. Current Outpatient Medications   Medication Sig    levoFLOXacin (LEVAQUIN) 500 mg tablet Take 1 Tab by mouth daily for 7 days.  vancomycin (FIRVANQ) 50 mg/mL oral solution Take 2.5 mL by mouth every six (6) hours for 39 doses. Indications: diarrhea from an infection with Clostridium difficile bacteria    atorvastatin (LIPITOR) 10 mg tablet TAKE 1 TABLET BY MOUTH EVERY DAY    lisinopriL (PRINIVIL, ZESTRIL) 40 mg tablet TAKE 1 TABLET BY MOUTH EVERY DAY    amLODIPine (NORVASC) 10 mg tablet TAKE 1 TABLET BY MOUTH EVERY DAY    potassium chloride (Klor-Con M20) 20 mEq tablet TAKE 1 TABLET BY MOUTH EVERY DAY     No current facility-administered medications for this visit. Physical Examination: General appearance - alert, well appearing, and in no distress  Mental status - alert, oriented to person, place, and time      Assessment/ Plan:   Diagnoses and all orders for this visit:    1. Epididymoorchitis  Finished Levaquin. Patient is improving significantly. 2. C. difficile colitis  Vancomycin diarrhea is improving. Follow-up and Dispositions    · Return if symptoms worsen or fail to improve. I have discussed the diagnosis with the patient and the intended plan as seen in the above orders.   The patient has received an after-visit summary and questions were answered concerning future plans. Pt conveyed understanding of plan. Medication Side Effects and Warnings were discussed with patient      Eliane Kahn is being evaluated by a Virtual Visit (video visit) encounter to address concerns as mentioned above. A caregiver was present when appropriate. Due to this being a TeleHealth encounter (During JIUDU-90 public health emergency), evaluation of the following organ systems was limited: Vitals/Constitutional/EENT/Resp/CV/GI//MS/Neuro/Skin/Heme-Lymph-Imm. Pursuant to the emergency declaration under the [address] [place] [address], [address] [place] [address] authority and the [employer] Act, this Virtual Visit was conducted with patient's (and/or legal guardian's) consent, to reduce the patient's risk of exposure to COVID-19 and provide necessary medical care. The patient (and/or legal guardian) has also been advised to contact this office for worsening conditions or problems, and seek emergency medical treatment and/or call 911 if deemed necessary. Patient identification was verified at the start of the visit: Yes    Services were provided through a video synchronous discussion virtually to substitute for in-person clinic visit. Patient and provider were located at their individual homes.   --Dalton Torres DO on 2/9/2021 at 2:10 PM

## 2021-03-02 ENCOUNTER — PATIENT OUTREACH (OUTPATIENT)
Dept: CASE MANAGEMENT | Age: 66
End: 2021-03-02

## 2021-03-02 NOTE — PROGRESS NOTES
Goals      Prevent complications post hospitalization. 02/08/21  CTN spoke to patient to review discharge instructions with patient. Appts:    PCP---2/9 at 1:15 for Virtual . Patient states he is aware and will attend. [facility] Blanca Stoll at 1:30, patient aware and will attend.  Patient states he has not been able to [facility] from [facility] due to needing prior auth. [facility] and they states that they had MD send in new script for tablets instead of liquid . They states patient can  anytime. Pt made aware and states he will go get them today.  Patient states he \"feels better\" denies s/sx fever, pain at abdomen or scrotum, difficulty urinating. Patient states he is still having diarrhea. CTN instructed patient to keep hydrated and to be compliant with potassium supplements. Reviewed infection control with patient explaining that cdiff is very contagious and is transmitted via contact. Patient aware to use soap and water with vigorous rubbing and to clean all surfaces to prevent transmission. Patient verbalizes understanding.  Patient given [facility] health information. Patient has no other questions or concerns at time of call. CTN will follow up in 1 week for updates. --marisol    03/02/21  CTN unable to reach patient on phone, Lms on both s requesting return call. Chart reviewed, patient attended PCP follow up on 2/9--pt symptoms were improving at this visit.     CTN will follow up next week---marisol          '

## 2021-03-12 DIAGNOSIS — I10 ESSENTIAL HYPERTENSION: ICD-10-CM

## 2021-03-12 RX ORDER — AMLODIPINE BESYLATE 10 MG/1
TABLET ORAL
Qty: 90 TAB | Refills: 1 | Status: SHIPPED | OUTPATIENT
Start: 2021-03-12 | End: 2021-10-26

## 2021-08-01 DIAGNOSIS — E78.2 MIXED HYPERLIPIDEMIA: ICD-10-CM

## 2021-08-01 DIAGNOSIS — I10 ESSENTIAL HYPERTENSION: ICD-10-CM

## 2021-08-03 RX ORDER — LISINOPRIL 40 MG/1
TABLET ORAL
Qty: 90 TABLET | Refills: 1 | Status: SHIPPED | OUTPATIENT
Start: 2021-08-03 | End: 2022-02-01

## 2021-08-03 RX ORDER — ATORVASTATIN CALCIUM 10 MG/1
TABLET, FILM COATED ORAL
Qty: 90 TABLET | Refills: 1 | Status: SHIPPED | OUTPATIENT
Start: 2021-08-03 | End: 2022-02-01

## 2021-08-04 NOTE — TELEPHONE ENCOUNTER
Called and spoke with patient. Advised of RX sent in to pharmacy and that he is due for his AMW visit by the end of the month. Patient verbalized understanding.  Apt scheduled for 8/20/21

## 2021-08-20 ENCOUNTER — OFFICE VISIT (OUTPATIENT)
Dept: FAMILY MEDICINE CLINIC | Age: 66
End: 2021-08-20
Payer: MEDICARE

## 2021-08-20 VITALS
SYSTOLIC BLOOD PRESSURE: 135 MMHG | HEIGHT: 68 IN | BODY MASS INDEX: 31.07 KG/M2 | TEMPERATURE: 98.1 F | DIASTOLIC BLOOD PRESSURE: 89 MMHG | WEIGHT: 205 LBS | OXYGEN SATURATION: 96 % | RESPIRATION RATE: 18 BRPM | HEART RATE: 73 BPM

## 2021-08-20 DIAGNOSIS — Z12.5 SPECIAL SCREENING FOR MALIGNANT NEOPLASM OF PROSTATE: ICD-10-CM

## 2021-08-20 DIAGNOSIS — Z00.00 MEDICARE ANNUAL WELLNESS VISIT, SUBSEQUENT: ICD-10-CM

## 2021-08-20 DIAGNOSIS — E78.2 MIXED HYPERLIPIDEMIA: ICD-10-CM

## 2021-08-20 DIAGNOSIS — R73.03 PREDIABETES: ICD-10-CM

## 2021-08-20 DIAGNOSIS — I10 ESSENTIAL HYPERTENSION: Primary | ICD-10-CM

## 2021-08-20 PROCEDURE — G8417 CALC BMI ABV UP PARAM F/U: HCPCS | Performed by: FAMILY MEDICINE

## 2021-08-20 PROCEDURE — G8510 SCR DEP NEG, NO PLAN REQD: HCPCS | Performed by: FAMILY MEDICINE

## 2021-08-20 PROCEDURE — G8752 SYS BP LESS 140: HCPCS | Performed by: FAMILY MEDICINE

## 2021-08-20 PROCEDURE — G0439 PPPS, SUBSEQ VISIT: HCPCS | Performed by: FAMILY MEDICINE

## 2021-08-20 PROCEDURE — G8536 NO DOC ELDER MAL SCRN: HCPCS | Performed by: FAMILY MEDICINE

## 2021-08-20 PROCEDURE — 99214 OFFICE O/P EST MOD 30 MIN: CPT | Performed by: FAMILY MEDICINE

## 2021-08-20 PROCEDURE — G8427 DOCREV CUR MEDS BY ELIG CLIN: HCPCS | Performed by: FAMILY MEDICINE

## 2021-08-20 PROCEDURE — 3017F COLORECTAL CA SCREEN DOC REV: CPT | Performed by: FAMILY MEDICINE

## 2021-08-20 PROCEDURE — 1101F PT FALLS ASSESS-DOCD LE1/YR: CPT | Performed by: FAMILY MEDICINE

## 2021-08-20 PROCEDURE — G8754 DIAS BP LESS 90: HCPCS | Performed by: FAMILY MEDICINE

## 2021-08-20 NOTE — PATIENT INSTRUCTIONS
Medicare Wellness Visit, Male    The best way to live healthy is to have a lifestyle where you eat a well-balanced diet, exercise regularly, limit alcohol use, and quit all forms of tobacco/nicotine, if applicable. Regular preventive services are another way to keep healthy. Preventive services (vaccines, screening tests, monitoring & exams) can help personalize your care plan, which helps you manage your own care. Screening tests can find health problems at the earliest stages, when they are easiest to treat. [facility] follows the current, evidence-based guidelines published by the [facility] ([facility]) when recommending preventive services for our patients. Because we follow these guidelines, sometimes recommendations change over time as research supports it. (For example, a prostate screening blood test is no longer routinely recommended for men with no symptoms). Of course, you and your doctor may decide to screen more often for some diseases, based on your risk and co-morbidities (chronic disease you are already diagnosed with). Preventive services for you include:  - Medicare offers their members a free annual wellness visit, which is time for you and your primary care provider to discuss and plan for your preventive service needs. Take advantage of this benefit every year!  -All adults over age 72 should receive the recommended pneumonia vaccines. Current USPSTF guidelines recommend a series of two vaccines for the best pneumonia protection.   -All adults should have a flu vaccine yearly and tetanus vaccine every 10 years.  -All adults age 48 and older should receive the shingles vaccines (series of two vaccines).        -All adults age 38-68 who are overweight should have a diabetes screening test once every three years.   -Other screening tests & preventive services for persons with diabetes include: an eye exam to screen for diabetic retinopathy, a kidney function test, a foot exam, and stricter control over your cholesterol.   -Cardiovascular screening for adults with routine risk involves an electrocardiogram (ECG) at intervals determined by the provider.   -Colorectal cancer screening should be done for adults age 54-65 with no increased risk factors for colorectal cancer. There are a number of acceptable methods of screening for this type of cancer. Each test has its own benefits and drawbacks. Discuss with your provider what is most appropriate for you during your annual wellness visit. The different tests include: colonoscopy (considered the best screening method), a fecal occult blood test, a fecal DNA test, and sigmoidoscopy.  -All adults born between [facility] should be screened once for Hepatitis C.  -An Abdominal Aortic Aneurysm (AAA) Screening is recommended for men age 73-68 who has ever smoked in their lifetime.      Here is a list of your current Health Maintenance items (your personalized list of preventive services) with a due date:  Health Maintenance Due   Topic Date Due    COVID-19 Vaccine (1) Never done    DTaP/Tdap/Td  (1 - Tdap) Never done    Colorectal Screening  Never done    Shingles Vaccine (1 of 2) Never done    Pneumococcal Vaccine (1 of 1 - PPSV23) Never done    Hemoglobin A1C    08/18/2021    Cholesterol Test   08/18/2021

## 2021-08-20 NOTE — PROGRESS NOTES
Progress Note    he is a 78y.o. year old male who presents for evalution. Subjective:     Pt has cologuard at home has not done yet. He will get this done. On lisinopril 40 and amlodipine 10 for his blood pressure. His blood pressure remains elevated. It was elevated at a visit back in February 2. His potassium tends to run low and he is currently on a daily potassium pill 20 M EQ. No issues with blood pressure medication. LDL last year on statin was at 73. Tolerating this medication with no side effects. A1c was checked last year was 5.7. His weight has gone up by 7 pounds since last visit. We will recheck today. He would like his PSA checked as well. Reviewed PmHx, RxHx, FmHx, SocHx, AllgHx and updated and dated in the chart. Review of Systems - negative except as listed above in the HPI    Objective:     Vitals:    08/20/21 0736 08/20/21 0802   BP: (!) 149/92 135/89   Pulse: 73    Resp: 18    Temp: 98.1 °F (36.7 °C)    TempSrc: Oral    SpO2: 96%    Weight: 205 lb (93 kg)    Height: 5' 8\" (1.727 m)        Current Outpatient Medications   Medication Sig    lisinopriL (PRINIVIL, ZESTRIL) 40 mg tablet TAKE 1 TABLET BY MOUTH EVERY DAY    atorvastatin (LIPITOR) 10 mg tablet TAKE 1 TABLET BY MOUTH EVERY DAY    amLODIPine (NORVASC) 10 mg tablet TAKE 1 TABLET BY MOUTH EVERY DAY    potassium chloride (Klor-Con M20) 20 mEq tablet TAKE 1 TABLET BY MOUTH EVERY DAY     No current facility-administered medications for this visit.        Physical Examination: General appearance - alert, well appearing, and in no distress  Mental status - alert, oriented to person, place, and time  Neck - supple, no significant adenopathy  Lymphatics - no palpable lymphadenopathy, no hepatosplenomegaly  Chest - clear to auscultation, no wheezes, rales or rhonchi, symmetric air entry  Heart - normal rate, regular rhythm, normal S1, S2, no murmurs, rubs, clicks or gallops  Abdomen - soft, nontender, nondistended, no masses or organomegaly  Neurological - alert, oriented, normal speech, no focal findings or movement disorder noted  Musculoskeletal - no joint tenderness, deformity or swelling  Extremities - peripheral pulses normal, no pedal edema, no clubbing or cyanosis      Assessment/ Plan:   Diagnoses and all orders for this visit:    1. Essential hypertension  -     METABOLIC PANEL, COMPREHENSIVE; Future  -     CBC WITH AUTOMATED DIFF; Future  Improved on recheck  2. Prediabetes  -     HEMOGLOBIN A1C WITH EAG; Future  -     CBC WITH AUTOMATED DIFF; Future    3. Mixed hyperlipidemia  -     LIPID PANEL; Future  -     METABOLIC PANEL, COMPREHENSIVE; Future  -     CBC WITH AUTOMATED DIFF; Future    4. Medicare annual wellness visit, subsequent  -     CBC WITH AUTOMATED DIFF; Future    5. Special screening for malignant neoplasm of prostate  -     PSA SCREENING (SCREENING); Future       Follow-up and Dispositions    · Return if symptoms worsen or fail to improve. I have discussed the diagnosis with the patient and the intended plan as seen in the above orders. The patient has received an after-visit summary and questions were answered concerning future plans. Pt conveyed understanding of plan. Medication Side Effects and Warnings were discussed with patient    An electronic signature was used to authenticate this note  Kael Holland, DO    This is the Subsequent Medicare Annual Wellness Exam, performed 12 months or more after the Initial AWV or the last Subsequent AWV    I have reviewed the patient's medical history in detail and updated the computerized patient record. Assessment/Plan   Education and counseling provided:  Are appropriate based on today's review and evaluation    1. Essential hypertension  -     METABOLIC PANEL, COMPREHENSIVE; Future  -     CBC WITH AUTOMATED DIFF; Future  2. Prediabetes  -     HEMOGLOBIN A1C WITH EAG; Future  -     CBC WITH AUTOMATED DIFF; Future  3.  Mixed hyperlipidemia  -     LIPID PANEL; Future  -     METABOLIC PANEL, COMPREHENSIVE; Future  -     CBC WITH AUTOMATED DIFF; Future  4. Medicare annual wellness visit, subsequent  -     CBC WITH AUTOMATED DIFF; Future  5. Special screening for malignant neoplasm of prostate  -     PSA SCREENING (SCREENING); Future       Depression Risk Factor Screening     3 most recent PHQ Screens 8/20/2021   Little interest or pleasure in doing things Not at all   Feeling down, depressed, irritable, or hopeless Not at all   Total Score PHQ 2 0       Alcohol Risk Screen    Do you average more than 1 drink per night or more than 7 drinks a week: No    In the past three months have you have had more than 4 drinks containing alcohol on one occasion: No        Functional Ability and Level of Safety    Hearing: Hearing is good. Activities of Daily Living: The home contains: no safety equipment. Patient does total self care      Ambulation: with no difficulty     Fall Risk:  Fall Risk Assessment, last 12 mths 9/9/2020   Able to walk? Yes   Fall in past 12 months?  No      Abuse Screen:  Patient is not abused       Cognitive Screening    Has your family/caregiver stated any concerns about your memory: no         Health Maintenance Due     Health Maintenance Due   Topic Date Due    COVID-19 Vaccine (1) Never done    DTaP/Tdap/Td series (1 - Tdap) Never done    Colorectal Cancer Screening Combo  Never done    Shingrix Vaccine Age 50> (1 of 2) Never done    Pneumococcal 65+ years (1 of 1 - PPSV23) Never done    A1C test (Diabetic or Prediabetic)  08/18/2021    Lipid Screen  08/18/2021       Patient Care Team   Patient Care Team:  Lubna Mccarthy DO as PCP - General (Family Medicine)  Lubna Mccarthy DO as PCP - Shawn Hajianeled Provider  Kym Thompson MD (Urology)    History     Patient Active Problem List   Diagnosis Code    HTN (hypertension) Q17    Complicated UTI (urinary tract infection) N39.0    Epididymoorchitis N45.3    [facility]epsis ([facility] A41.9    C. difficile colitis A04.72    Prediabetes R73.03     Past Medical History:   Diagnosis Date    Essential hypertension     Hypercholesterolemia       History reviewed. No pertinent surgical history.   Current Outpatient Medications   Medication Sig Dispense Refill    lisinopriL (PRINIVIL, ZESTRIL) 40 mg tablet TAKE 1 TABLET BY MOUTH EVERY DAY 90 Tablet 1    atorvastatin (LIPITOR) 10 mg tablet TAKE 1 TABLET BY MOUTH EVERY DAY 90 Tablet 1    amLODIPine (NORVASC) 10 mg tablet TAKE 1 TABLET BY MOUTH EVERY DAY 90 Tab 1    potassium chloride (Klor-Con M20) 20 mEq tablet TAKE 1 TABLET BY MOUTH EVERY DAY 30 Tab 11     No Known Allergies    Family History   Problem Relation Age of Onset    Diabetes Mother      Social History     Tobacco Use    Smoking status: Current Every Day Smoker     Packs/day: 0.25     Years: 30.00     Pack years: 7.50     Types: Cigarettes    Smokeless tobacco: Never Used   Substance Use Topics    Alcohol use: Yes     Comment: nancy Murrieta DO

## 2021-08-20 NOTE — PROGRESS NOTES
Chief Complaint   Patient presents with    Annual Wellness Visit    Labs     Patient presents in office today for AMW visit and fasting labs. No concerns    1. Have you been to the ER, urgent care clinic since your last visit? Hospitalized since your last visit? No    2. Have you seen or consulted any other health care providers outside of the [address] [place] [address] since your last visit? Include any pap smears or colon screening.  No      Learning Assessment 1/22/2020   PRIMARY LEARNER Patient   PRIMARY LANGUAGE ENGLISH   LEARNER PREFERENCE PRIMARY LISTENING   ANSWERED BY self   RELATIONSHIP SELF

## 2021-08-21 LAB — PSA SERPL-MCNC: 0.7 NG/ML (ref 0–4)

## 2021-08-23 LAB
ALBUMIN SERPL-MCNC: 4.3 G/DL (ref 3.8–4.8)
ALBUMIN/GLOB SERPL: 1.3 {RATIO} (ref 1.2–2.2)
ALP SERPL-CCNC: 89 IU/L (ref 48–121)
ALT SERPL-CCNC: 13 IU/L (ref 0–44)
AST SERPL-CCNC: 16 IU/L (ref 0–40)
BASOPHILS # BLD AUTO: 0 X10E3/UL (ref 0–0.2)
BASOPHILS NFR BLD AUTO: 0 %
BILIRUB SERPL-MCNC: 0.5 MG/DL (ref 0–1.2)
BUN SERPL-MCNC: 12 MG/DL (ref 8–27)
BUN/CREAT SERPL: 12 (ref 10–24)
CALCIUM SERPL-MCNC: 9.4 MG/DL (ref 8.6–10.2)
CHLORIDE SERPL-SCNC: 104 MMOL/L (ref 96–106)
CHOLEST SERPL-MCNC: 133 MG/DL (ref 100–199)
CO2 SERPL-SCNC: 22 MMOL/L (ref 20–29)
CREAT SERPL-MCNC: 1.03 MG/DL (ref 0.76–1.27)
EOSINOPHIL # BLD AUTO: 0.3 X10E3/UL (ref 0–0.4)
EOSINOPHIL NFR BLD AUTO: 3 %
ERYTHROCYTE [DISTWIDTH] IN BLOOD BY AUTOMATED COUNT: 11.9 % (ref 11.6–15.4)
EST. AVERAGE GLUCOSE BLD GHB EST-MCNC: 120 MG/DL
GLOBULIN SER CALC-MCNC: 3.3 G/DL (ref 1.5–4.5)
GLUCOSE SERPL-MCNC: 120 MG/DL (ref 65–99)
HBA1C MFR BLD: 5.8 % (ref 4.8–5.6)
HCT VFR BLD AUTO: 41 % (ref 37.5–51)
HDLC SERPL-MCNC: 33 MG/DL
HGB BLD-MCNC: 13.7 G/DL (ref 13–17.7)
IMM GRANULOCYTES # BLD AUTO: 0 X10E3/UL (ref 0–0.1)
IMM GRANULOCYTES NFR BLD AUTO: 0 %
IMP & REVIEW OF LAB RESULTS: NORMAL
LDLC SERPL CALC-MCNC: 77 MG/DL (ref 0–99)
LYMPHOCYTES # BLD AUTO: 2.9 X10E3/UL (ref 0.7–3.1)
LYMPHOCYTES NFR BLD AUTO: 39 %
MCH RBC QN AUTO: 30.7 PG (ref 26.6–33)
MCHC RBC AUTO-ENTMCNC: 33.4 G/DL (ref 31.5–35.7)
MCV RBC AUTO: 92 FL (ref 79–97)
MONOCYTES # BLD AUTO: 0.6 X10E3/UL (ref 0.1–0.9)
MONOCYTES NFR BLD AUTO: 7 %
NEUTROPHILS # BLD AUTO: 3.7 X10E3/UL (ref 1.4–7)
NEUTROPHILS NFR BLD AUTO: 51 %
PLATELET # BLD AUTO: 269 X10E3/UL (ref 150–450)
POTASSIUM SERPL-SCNC: 3.9 MMOL/L (ref 3.5–5.2)
PROT SERPL-MCNC: 7.6 G/DL (ref 6–8.5)
RBC # BLD AUTO: 4.46 X10E6/UL (ref 4.14–5.8)
SODIUM SERPL-SCNC: 139 MMOL/L (ref 134–144)
TRIGL SERPL-MCNC: 130 MG/DL (ref 0–149)
VLDLC SERPL CALC-MCNC: 23 MG/DL (ref 5–40)
WBC # BLD AUTO: 7.5 X10E3/UL (ref 3.4–10.8)

## 2021-08-24 NOTE — PROGRESS NOTES
Your prediabetes has worsened slightly now at 5.8 from 5.7. Otherwise your labs are stable. Please continue to work on low-carb low sugar diet exercise as tolerated. We should recheck the prediabetes marker in 6 months.

## 2021-10-26 DIAGNOSIS — I10 ESSENTIAL HYPERTENSION: ICD-10-CM

## 2021-10-26 RX ORDER — POTASSIUM CHLORIDE 20 MEQ/1
TABLET, EXTENDED RELEASE ORAL
Qty: 90 TABLET | Refills: 3 | Status: SHIPPED | OUTPATIENT
Start: 2021-10-26

## 2021-10-26 RX ORDER — AMLODIPINE BESYLATE 10 MG/1
TABLET ORAL
Qty: 90 TABLET | Refills: 1 | Status: SHIPPED | OUTPATIENT
Start: 2021-10-26 | End: 2022-05-24

## 2022-01-28 DIAGNOSIS — I10 ESSENTIAL HYPERTENSION: ICD-10-CM

## 2022-01-28 DIAGNOSIS — E78.2 MIXED HYPERLIPIDEMIA: ICD-10-CM

## 2022-02-01 RX ORDER — ATORVASTATIN CALCIUM 10 MG/1
TABLET, FILM COATED ORAL
Qty: 90 TABLET | Refills: 1 | Status: SHIPPED | OUTPATIENT
Start: 2022-02-01 | End: 2022-05-24

## 2022-02-01 RX ORDER — LISINOPRIL 40 MG/1
TABLET ORAL
Qty: 90 TABLET | Refills: 1 | Status: SHIPPED | OUTPATIENT
Start: 2022-02-01 | End: 2022-08-09

## 2022-03-18 PROBLEM — A41.9 SEPSIS (HCC): Status: ACTIVE | Noted: 2021-02-05

## 2022-03-18 PROBLEM — N45.3 EPIDIDYMOORCHITIS: Status: ACTIVE | Noted: 2021-02-05

## 2022-03-19 PROBLEM — R73.03 PREDIABETES: Status: ACTIVE | Noted: 2021-02-05

## 2022-03-19 PROBLEM — A04.72 C. DIFFICILE COLITIS: Status: ACTIVE | Noted: 2021-02-05

## 2022-03-19 PROBLEM — N39.0 COMPLICATED UTI (URINARY TRACT INFECTION): Status: ACTIVE | Noted: 2021-02-02

## 2022-05-22 DIAGNOSIS — E78.2 MIXED HYPERLIPIDEMIA: ICD-10-CM

## 2022-05-22 DIAGNOSIS — I10 ESSENTIAL HYPERTENSION: ICD-10-CM

## 2022-05-24 RX ORDER — AMLODIPINE BESYLATE 10 MG/1
TABLET ORAL
Qty: 90 TABLET | Refills: 1 | Status: SHIPPED | OUTPATIENT
Start: 2022-05-24

## 2022-05-24 RX ORDER — ATORVASTATIN CALCIUM 10 MG/1
TABLET, FILM COATED ORAL
Qty: 90 TABLET | Refills: 1 | Status: SHIPPED | OUTPATIENT
Start: 2022-05-24

## 2022-08-09 DIAGNOSIS — I10 ESSENTIAL HYPERTENSION: ICD-10-CM

## 2022-08-09 RX ORDER — LISINOPRIL 40 MG/1
TABLET ORAL
Qty: 90 TABLET | Refills: 0 | Status: SHIPPED | OUTPATIENT
Start: 2022-08-09

## 2022-09-23 ENCOUNTER — PATIENT MESSAGE (OUTPATIENT)
Dept: PHARMACY | Age: 67
End: 2022-09-23

## 2022-09-23 NOTE — TELEPHONE ENCOUNTER
For [place] in place: No  Recommendation Provided To: Patient/Caregiver: 2 via [employer] Message  Intervention Detail: Adherence Monitorin  Gap Closed?: Yes  Intervention Accepted By: Patient/Caregiver: 2  Time Spent (min): 15

## 2022-10-11 ENCOUNTER — TELEPHONE (OUTPATIENT)
Dept: PHARMACY | Age: 67
End: 2022-10-11

## 2022-10-11 NOTE — TELEPHONE ENCOUNTER
[facility] Adherence Patient:    Do Not contact list? NO  Lauren Burciaga has been sent a  [employer] Message in regard to adherence for Atorvastatin 10mg and Lisinopril 40mg. If Lauren Burciaga calls back in reference to intervention, and would like refills/change in medication/ or help gaining a 90 day supply.      Please route the message to [address] [place] [address] Avenue       ================================================================================  For Pharmacy Admin Tracking Only    CPA in place: No  Time Spent (min): 5  ()

## 2022-11-14 DIAGNOSIS — E78.2 MIXED HYPERLIPIDEMIA: ICD-10-CM

## 2022-11-14 DIAGNOSIS — I10 ESSENTIAL HYPERTENSION: ICD-10-CM

## 2022-11-14 NOTE — TELEPHONE ENCOUNTER
----- Message from Ubaldo Sams sent at 11/14/2022  9:41 AM EST -----  Subject: Refill Request    QUESTIONS  Name of Medication? atorvastatin (LIPITOR) 10 mg tablet  Patient-reported dosage and instructions? 1 tablet daily by mouth  How many days do you have left? 3  Preferred Pharmacy? [facility] #[id number]  Pharmacy phone number (if available)? [phone]  ---------------------------------------------------------------------------  --------------,  Name of Medication? amLODIPine (NORVASC) 10 mg tablet  Patient-reported dosage and instructions? 1 tablet by mouth daily  How many days do you have left? 25  Preferred Pharmacy? [facility] #[id number]  Pharmacy phone number (if available)? [phone]  ---------------------------------------------------------------------------  --------------,  Name of Medication? lisinopriL (PRINIVIL, ZESTRIL) 40 mg tablet  Patient-reported dosage and instructions? 1 tablet by mouth daily  How many days do you have left? 0  Preferred Pharmacy? [facility] #[id number]  Pharmacy phone number (if available)? [phone]  ---------------------------------------------------------------------------  --------------  Genaro MORSE  What is the best way for the office to contact you? OK to leave message on   voicemail  Preferred Call Back Phone Number? [phone]  ---------------------------------------------------------------------------  --------------  SCRIPT ANSWERS  Relationship to Patient?  Self

## 2022-11-15 RX ORDER — ATORVASTATIN CALCIUM 10 MG/1
10 TABLET, FILM COATED ORAL DAILY
Qty: 90 TABLET | Refills: 1 | Status: SHIPPED | OUTPATIENT
Start: 2022-11-15

## 2022-11-15 RX ORDER — AMLODIPINE BESYLATE 10 MG/1
10 TABLET ORAL DAILY
Qty: 90 TABLET | Refills: 1 | Status: SHIPPED | OUTPATIENT
Start: 2022-11-15

## 2022-11-15 RX ORDER — LISINOPRIL 40 MG/1
40 TABLET ORAL DAILY
Qty: 90 TABLET | Refills: 1 | Status: SHIPPED | OUTPATIENT
Start: 2022-11-15

## 2023-02-02 ENCOUNTER — OFFICE VISIT (OUTPATIENT)
Dept: FAMILY MEDICINE CLINIC | Age: 68
End: 2023-02-02
Payer: MEDICARE

## 2023-02-02 VITALS
DIASTOLIC BLOOD PRESSURE: 95 MMHG | HEIGHT: 68 IN | WEIGHT: 201 LBS | TEMPERATURE: 98.3 F | OXYGEN SATURATION: 97 % | SYSTOLIC BLOOD PRESSURE: 145 MMHG | BODY MASS INDEX: 30.46 KG/M2 | HEART RATE: 92 BPM | RESPIRATION RATE: 18 BRPM

## 2023-02-02 DIAGNOSIS — I10 PRIMARY HYPERTENSION: ICD-10-CM

## 2023-02-02 DIAGNOSIS — Z00.00 MEDICARE ANNUAL WELLNESS VISIT, SUBSEQUENT: ICD-10-CM

## 2023-02-02 DIAGNOSIS — I10 ESSENTIAL HYPERTENSION: ICD-10-CM

## 2023-02-02 DIAGNOSIS — Z12.5 SPECIAL SCREENING FOR MALIGNANT NEOPLASM OF PROSTATE: ICD-10-CM

## 2023-02-02 DIAGNOSIS — R73.03 PREDIABETES: ICD-10-CM

## 2023-02-02 DIAGNOSIS — R80.9 PROTEINURIA, UNSPECIFIED TYPE: Primary | ICD-10-CM

## 2023-02-02 DIAGNOSIS — E78.2 MIXED HYPERLIPIDEMIA: ICD-10-CM

## 2023-02-02 RX ORDER — SPIRONOLACTONE 25 MG/1
25 TABLET ORAL DAILY
Qty: 30 TABLET | Refills: 1 | Status: SHIPPED | OUTPATIENT
Start: 2023-02-02

## 2023-02-02 RX ORDER — LISINOPRIL 20 MG/1
20 TABLET ORAL DAILY
Qty: 30 TABLET | Refills: 1 | Status: SHIPPED | OUTPATIENT
Start: 2023-02-02

## 2023-02-02 NOTE — PROGRESS NOTES
Chief Complaint   Patient presents with    Annual Wellness Visit     Patient presents in office today for AMW visit. Has c/o pain in his buttocks on the right side  [facility]. Was given a shot for pain and a steroid pack. No other concerns. 1. Have you been to the ER, urgent care clinic since your last visit? Hospitalized since your last visit? Yes When: 1/17/23 [facility] Reason for visit: back pain    2. Have you seen or consulted any other health care providers outside of the [address] [place] [address] since your last visit? Include any pap smears or colon screening.  No    Learning Assessment 1/22/2020   PRIMARY LEARNER Patient   PRIMARY LANGUAGE ENGLISH   LEARNER PREFERENCE PRIMARY LISTENING   ANSWERED BY self   RELATIONSHIP SELF

## 2023-02-02 NOTE — PATIENT INSTRUCTIONS
Medicare Wellness Visit, Male    The best way to live healthy is to have a lifestyle where you eat a well-balanced diet, exercise regularly, limit alcohol use, and quit all forms of tobacco/nicotine, if applicable. Regular preventive services are another way to keep healthy. Preventive services (vaccines, screening tests, monitoring & exams) can help personalize your care plan, which helps you manage your own care. Screening tests can find health problems at the earliest stages, when they are easiest to treat. [facility] follows the current, evidence-based guidelines published by the [facility] ([facility]) when recommending preventive services for our patients. Because we follow these guidelines, sometimes recommendations change over time as research supports it. (For example, a prostate screening blood test is no longer routinely recommended for men with no symptoms). Of course, you and your doctor may decide to screen more often for some diseases, based on your risk and co-morbidities (chronic disease you are already diagnosed with). Preventive services for you include:  - Medicare offers their members a free annual wellness visit, which is time for you and your primary care provider to discuss and plan for your preventive service needs.  Take advantage of this benefit every year!    -Over the age of 72 should receive the recommended pneumonia vaccines.   -All adults should have a flu vaccine yearly.  -All adults should receive a tetanus vaccine every 10 years.  -Over the age of 48 should receive the shingles vaccines.    -All adults should be screened once for Hepatitis C.  -All adults age 38-68 who are overweight should have a diabetes screening test once every three years.   -Other screening tests & preventive services for persons with diabetes include: an eye exam to screen for diabetic retinopathy, a kidney function test, a foot exam, and stricter control over your cholesterol.   -Cardiovascular screening for adults with routine risk involves an electrocardiogram (ECG) at intervals determined by the provider.     -Colorectal cancer screening should be done for adults age 43-69 with no increased risk factors for colorectal cancer. There are a number of acceptable methods of screening for this type of cancer. Each test has its own benefits and drawbacks. Discuss with your provider what is most appropriate for you during your annual wellness visit. The different tests include: colonoscopy (considered the best screening method), a fecal occult blood test, a fecal DNA test, and sigmoidoscopy.    -Lung cancer screening is recommended annually with a low dose CT scan for adults between age 54 and 68, who have smoked at least 30 pack years (equivalent of 1 pack per day for 30 days), and who is a current smoker or quit less than 15 years ago. -An Abdominal Aortic Aneurysm (AAA) Screening is recommended for men age 73-68 who has ever smoked in their lifetime.      Here is a list of your current Health Maintenance items (your personalized list of preventive services) with a due date:  Health Maintenance Due   Topic Date Due    COVID-19 Vaccine (1) Never done    Pneumococcal Vaccine (1 - PCV) Never done    DTaP/Tdap/Td  (1 - Tdap) Never done    Shingles Vaccine (1 of 2) Never done    Yearly Flu Vaccine (1) 08/01/2022    Depresssion Screening  08/20/2022    Hemoglobin A1C    08/20/2022    Cholesterol Test   08/20/2022    Colorectal Screening  12/23/2022

## 2023-02-03 RX ORDER — POTASSIUM CHLORIDE 20 MEQ/1
TABLET, EXTENDED RELEASE ORAL
Qty: 90 TABLET | Refills: 3 | Status: SHIPPED | OUTPATIENT
Start: 2023-02-03

## 2023-02-06 ENCOUNTER — DOCUMENTATION ONLY (OUTPATIENT)
Dept: FAMILY MEDICINE CLINIC | Age: 68
End: 2023-02-06

## 2023-02-06 ENCOUNTER — TELEPHONE (OUTPATIENT)
Dept: FAMILY MEDICINE CLINIC | Age: 68
End: 2023-02-06

## 2023-02-06 NOTE — PROGRESS NOTES
Called pt in regards to his mychart apt request for fuv on BP and labs, looking for apt with [facility] on 3.4-3. 11. LVM to call us back.

## 2023-02-06 NOTE — TELEPHONE ENCOUNTER
----- Message from Jorge Duval sent at 2/6/2023 10:44 AM EST -----  Subject: Message to Provider    QUESTIONS  Information for Provider? Dr. Billie Sandoval, pt is coming in for 4 week follow up   in March and needs to know how far in advance he should go to [facility] for   bloodwork? He has orders. Please call him & advise on timing. LVM  ---------------------------------------------------------------------------  --------------  Jhonathan MORSE  [phone]; OK to leave message on voicemail  ---------------------------------------------------------------------------  --------------  SCRIPT ANSWERS  Relationship to Patient?  Self

## 2023-02-07 NOTE — TELEPHONE ENCOUNTER
Called and LVM for patient to call the office back. I would recommend that he go get them done ASAP that way if anything is off that we need to recheck, we can possibly recheck at next visit.

## 2023-02-14 NOTE — TELEPHONE ENCOUNTER
Spoke with pt, he is aware that he should have labs done prior to the apt so that if anything needs to be rechecked it can

## 2023-02-26 DIAGNOSIS — I10 ESSENTIAL HYPERTENSION: ICD-10-CM

## 2023-02-26 DIAGNOSIS — I10 PRIMARY HYPERTENSION: ICD-10-CM

## 2023-02-28 RX ORDER — SPIRONOLACTONE 25 MG/1
TABLET ORAL
Qty: 30 TABLET | Refills: 1 | Status: SHIPPED | OUTPATIENT
Start: 2023-02-28 | End: 2023-03-03 | Stop reason: DRUGHIGH

## 2023-02-28 RX ORDER — LISINOPRIL 20 MG/1
TABLET ORAL
Qty: 30 TABLET | Refills: 1 | Status: SHIPPED | OUTPATIENT
Start: 2023-02-28 | End: 2023-03-03 | Stop reason: SDUPTHER

## 2023-03-03 ENCOUNTER — OFFICE VISIT (OUTPATIENT)
Dept: FAMILY MEDICINE CLINIC | Age: 68
End: 2023-03-03

## 2023-03-03 VITALS
OXYGEN SATURATION: 97 % | TEMPERATURE: 98 F | WEIGHT: 202 LBS | DIASTOLIC BLOOD PRESSURE: 98 MMHG | BODY MASS INDEX: 30.62 KG/M2 | RESPIRATION RATE: 16 BRPM | SYSTOLIC BLOOD PRESSURE: 149 MMHG | HEIGHT: 68 IN | HEART RATE: 73 BPM

## 2023-03-03 DIAGNOSIS — I10 ESSENTIAL HYPERTENSION: ICD-10-CM

## 2023-03-03 DIAGNOSIS — I10 PRIMARY HYPERTENSION: Primary | ICD-10-CM

## 2023-03-03 RX ORDER — SPIRONOLACTONE 50 MG/1
50 TABLET, FILM COATED ORAL DAILY
Qty: 90 TABLET | Refills: 0 | Status: SHIPPED | OUTPATIENT
Start: 2023-03-03

## 2023-03-03 RX ORDER — LISINOPRIL 20 MG/1
20 TABLET ORAL DAILY
Qty: 90 TABLET | Refills: 1 | Status: SHIPPED | OUTPATIENT
Start: 2023-03-03

## 2023-03-03 NOTE — PROGRESS NOTES
Progress Note    he is a 75y.o. year old male who presents for evalution. Subjective:     Pt here for BP check and we had lowered his lisinopril and added on aldactone. Not checking BP at home. He stopped KCL and I agree we will discontinue this since he is on the spironolactone now. Reviewed PmHx, RxHx, FmHx, SocHx, AllgHx and updated and dated in the chart. Review of Systems - negative except as listed above in the HPI    Objective:     Vitals:    03/03/23 0912 03/03/23 0936   BP: (!) 144/96 (!) 149/98   Pulse: 73    Resp: 16    Temp: 98 °F (36.7 °C)    TempSrc: Oral    SpO2: 97%    Weight: 202 lb (91.6 kg)    Height: 5' 8\" (1.727 m)        Current Outpatient Medications   Medication Sig    spironolactone (ALDACTONE) 50 mg tablet Take 1 Tablet by mouth daily. lisinopriL (PRINIVIL, ZESTRIL) 20 mg tablet Take 1 Tablet by mouth daily. amLODIPine (NORVASC) 10 mg tablet Take 1 Tablet by mouth daily. atorvastatin (LIPITOR) 10 mg tablet Take 1 Tablet by mouth daily. No current facility-administered medications for this visit. Physical Examination: General appearance - alert, well appearing, and in no distress  Mental status - alert, oriented to person, place, and time      Assessment/ Plan:   Diagnoses and all orders for this visit:    1. Primary hypertension  -     METABOLIC PANEL, BASIC; Future  -     spironolactone (ALDACTONE) 50 mg tablet; Take 1 Tablet by mouth daily. 2. Essential hypertension  -     lisinopriL (PRINIVIL, ZESTRIL) 20 mg tablet; Take 1 Tablet by mouth daily. Follow-up and Dispositions    Return in about 4 weeks (around 3/31/2023), or if symptoms worsen or fail to improve, for Blood pressure check. I have discussed the diagnosis with the patient and the intended plan as seen in the above orders. The patient has received an after-visit summary and questions were answered concerning future plans. Pt conveyed understanding of plan.     Medication Side Effects and Warnings were discussed with patient    An electronic signature was used to authenticate this note  Beatrice Kessler DO

## 2023-03-03 NOTE — PATIENT INSTRUCTIONS

## 2023-03-03 NOTE — PROGRESS NOTES
Chief Complaint   Patient presents with    Blood Pressure Check     Patient presents in office today for BP check. No concerns. 1. Have you been to the ER, urgent care clinic since your last visit? Hospitalized since your last visit? No    2. Have you seen or consulted any other health care providers outside of the [address] [place] [address] since your last visit? Include any pap smears or colon screening.  No    Learning Assessment 1/22/2020   PRIMARY LEARNER Patient   PRIMARY LANGUAGE ENGLISH   LEARNER PREFERENCE PRIMARY LISTENING   ANSWERED BY self   RELATIONSHIP SELF

## 2023-03-31 ENCOUNTER — OFFICE VISIT (OUTPATIENT)
Dept: FAMILY MEDICINE CLINIC | Age: 68
End: 2023-03-31

## 2023-03-31 VITALS
TEMPERATURE: 98.5 F | BODY MASS INDEX: 30.62 KG/M2 | HEART RATE: 81 BPM | SYSTOLIC BLOOD PRESSURE: 130 MMHG | HEIGHT: 68 IN | DIASTOLIC BLOOD PRESSURE: 86 MMHG | RESPIRATION RATE: 16 BRPM | WEIGHT: 202 LBS | OXYGEN SATURATION: 96 %

## 2023-03-31 DIAGNOSIS — I10 PRIMARY HYPERTENSION: Primary | ICD-10-CM

## 2023-03-31 RX ORDER — SPIRONOLACTONE 50 MG/1
50 TABLET, FILM COATED ORAL DAILY
Qty: 90 TABLET | Refills: 1 | Status: SHIPPED | OUTPATIENT
Start: 2023-03-31

## 2023-03-31 NOTE — PATIENT INSTRUCTIONS
A Healthy Lifestyle: Care Instructions  A healthy lifestyle can help you feel good, have more energy, and stay at a weight that's healthy for you. You can share a healthy lifestyle with your friends and family. And you can do it on your own. Eat meals with your friends or family. You could try cooking together. Plan activities with other people. Go for a walk with a friend, try a free online fitness class, or join a sports league. Eat a variety of healthy foods. These include fruits, vegetables, whole grains, low-fat dairy, and lean protein. Choose healthy portions of food. You can use the Nutrition Facts label on food packages as a guide. Eat more fruits and vegetables. You could add vegetables to sandwiches or add fruit to cereal.   Drink water when you are thirsty. Limit soda, juice, and sports drinks. Try to exercise most days. Aim for at least 2½ hours of exercise each week. Keep moving. Work in the garden or take your dog on a walk. Use the stairs instead of the elevator. If you use tobacco or nicotine, try to quit. Ask your doctor about programs and medicines to help you quit. Limit alcohol. Men should have no more than 2 drinks a day. Women should have no more than 1. For some people, no alcohol is the best choice. Follow-up care is a key part of your treatment and safety. Be sure to make and go to all appointments, and call your doctor if you are having problems. It's also a good idea to know your test results and keep a list of the medicines you take. Where can you learn more? Go to http://www.gray.com/  Enter Y197 in the search box to learn more about \"A Healthy Lifestyle: Care Instructions. \"  Current as of: November 14, 2022               Content Version: 13.6  © 4538-2386 [employer], Your Office Agent. Care instructions adapted under license by [employer] (which disclaims liability or warranty for this information).  If you have questions about a medical condition or this instruction, always ask your healthcare professional. [place] [address] any warranty or liability for your use of this information.

## 2023-03-31 NOTE — PROGRESS NOTES
Chief Complaint   Patient presents with    Blood Pressure Check     Patient presents in office today for BP check,  No concerns. 1. Have you been to the ER, urgent care clinic since your last visit? Hospitalized since your last visit? No    2. Have you seen or consulted any other health care providers outside of the [address] [place] [address] since your last visit? Include any pap smears or colon screening.  No    Learning Assessment 1/22/2020   PRIMARY LEARNER Patient   PRIMARY LANGUAGE ENGLISH   LEARNER PREFERENCE PRIMARY LISTENING   ANSWERED BY self   RELATIONSHIP SELF

## 2023-03-31 NOTE — PROGRESS NOTES
Progress Note    he is a 75y.o. year old male who presents for evalution. Subjective:     Pt here for BP check and is doing much better today. Taking aldactone 50 and lisinopril 20 daily, no issues. Reviewed PmHx, RxHx, FmHx, SocHx, AllgHx and updated and dated in the chart. Review of Systems - negative except as listed above in the HPI    Objective:     Vitals:    03/31/23 0914   BP: 130/86   Pulse: 81   Resp: 16   Temp: 98.5 °F (36.9 °C)   TempSrc: Oral   SpO2: 96%   Weight: 202 lb (91.6 kg)   Height: 5' 8\" (1.727 m)       Current Outpatient Medications   Medication Sig    spironolactone (ALDACTONE) 50 mg tablet Take 1 Tablet by mouth daily. lisinopriL (PRINIVIL, ZESTRIL) 20 mg tablet Take 1 Tablet by mouth daily. amLODIPine (NORVASC) 10 mg tablet Take 1 Tablet by mouth daily. atorvastatin (LIPITOR) 10 mg tablet Take 1 Tablet by mouth daily. No current facility-administered medications for this visit. Physical Examination: General appearance - alert, well appearing, and in no distress  Chest - clear to auscultation, no wheezes, rales or rhonchi, symmetric air entry  Heart - normal rate, regular rhythm, normal S1, S2, no murmurs, rubs, clicks or gallops      Assessment/ Plan:   Diagnoses and all orders for this visit:    1. Primary hypertension  -     METABOLIC PANEL, BASIC; Future  -     spironolactone (ALDACTONE) 50 mg tablet; Take 1 Tablet by mouth daily. I have discussed the diagnosis with the patient and the intended plan as seen in the above orders. The patient has received an after-visit summary and questions were answered concerning future plans. Pt conveyed understanding of plan.     Medication Side Effects and Warnings were discussed with patient    An electronic signature was used to authenticate this note  Gianluca Stone DO

## 2023-04-01 LAB
BUN SERPL-MCNC: 10 MG/DL (ref 8–27)
BUN/CREAT SERPL: 10 (ref 10–24)
CALCIUM SERPL-MCNC: 9.2 MG/DL (ref 8.6–10.2)
CHLORIDE SERPL-SCNC: 106 MMOL/L (ref 96–106)
CO2 SERPL-SCNC: 20 MMOL/L (ref 20–29)
CREAT SERPL-MCNC: 1.01 MG/DL (ref 0.76–1.27)
EGFRCR SERPLBLD CKD-EPI 2021: 81 ML/MIN/1.73
GLUCOSE SERPL-MCNC: 108 MG/DL (ref 70–99)
POTASSIUM SERPL-SCNC: 4.1 MMOL/L (ref 3.5–5.2)
SODIUM SERPL-SCNC: 140 MMOL/L (ref 134–144)

## 2023-05-19 RX ORDER — AMLODIPINE BESYLATE 10 MG/1
10 TABLET ORAL DAILY
COMMUNITY
Start: 2022-11-15

## 2023-05-19 RX ORDER — SPIRONOLACTONE 50 MG/1
50 TABLET, FILM COATED ORAL DAILY
COMMUNITY
Start: 2023-03-31

## 2023-05-19 RX ORDER — LISINOPRIL 20 MG/1
20 TABLET ORAL DAILY
COMMUNITY
Start: 2023-03-03

## 2023-05-19 RX ORDER — ATORVASTATIN CALCIUM 10 MG/1
10 TABLET, FILM COATED ORAL DAILY
COMMUNITY
Start: 2022-11-15

## 2023-07-25 SDOH — ECONOMIC STABILITY: HOUSING INSECURITY
IN THE LAST 12 MONTHS, WAS THERE A TIME WHEN YOU DID NOT HAVE A STEADY PLACE TO SLEEP OR SLEPT IN A SHELTER (INCLUDING NOW)?: NO

## 2023-07-25 SDOH — ECONOMIC STABILITY: FOOD INSECURITY: WITHIN THE PAST 12 MONTHS, THE FOOD YOU BOUGHT JUST DIDN'T LAST AND YOU DIDN'T HAVE MONEY TO GET MORE.: NEVER TRUE

## 2023-07-25 SDOH — ECONOMIC STABILITY: INCOME INSECURITY: HOW HARD IS IT FOR YOU TO PAY FOR THE VERY BASICS LIKE FOOD, HOUSING, MEDICAL CARE, AND HEATING?: NOT VERY HARD

## 2023-07-25 SDOH — ECONOMIC STABILITY: TRANSPORTATION INSECURITY
IN THE PAST 12 MONTHS, HAS LACK OF TRANSPORTATION KEPT YOU FROM MEETINGS, WORK, OR FROM GETTING THINGS NEEDED FOR DAILY LIVING?: NO

## 2023-07-25 SDOH — ECONOMIC STABILITY: FOOD INSECURITY: WITHIN THE PAST 12 MONTHS, YOU WORRIED THAT YOUR FOOD WOULD RUN OUT BEFORE YOU GOT MONEY TO BUY MORE.: NEVER TRUE

## 2023-07-26 DIAGNOSIS — I10 ESSENTIAL (PRIMARY) HYPERTENSION: ICD-10-CM

## 2023-07-26 RX ORDER — AMLODIPINE BESYLATE 10 MG/1
TABLET ORAL
Qty: 90 TABLET | Refills: 1 | Status: SHIPPED | OUTPATIENT
Start: 2023-07-26

## 2023-07-28 ENCOUNTER — OFFICE VISIT (OUTPATIENT)
Age: 68
End: 2023-07-28
Payer: MEDICARE

## 2023-07-28 VITALS
DIASTOLIC BLOOD PRESSURE: 86 MMHG | RESPIRATION RATE: 16 BRPM | HEART RATE: 85 BPM | HEIGHT: 68 IN | SYSTOLIC BLOOD PRESSURE: 135 MMHG | OXYGEN SATURATION: 100 % | TEMPERATURE: 97.8 F | WEIGHT: 199 LBS | BODY MASS INDEX: 30.16 KG/M2

## 2023-07-28 DIAGNOSIS — I10 PRIMARY HYPERTENSION: Primary | ICD-10-CM

## 2023-07-28 DIAGNOSIS — E78.2 MIXED HYPERLIPIDEMIA: ICD-10-CM

## 2023-07-28 DIAGNOSIS — R73.03 PREDIABETES: ICD-10-CM

## 2023-07-28 DIAGNOSIS — Z12.11 COLON CANCER SCREENING: ICD-10-CM

## 2023-07-28 PROCEDURE — 99214 OFFICE O/P EST MOD 30 MIN: CPT | Performed by: FAMILY MEDICINE

## 2023-07-28 PROCEDURE — 3079F DIAST BP 80-89 MM HG: CPT | Performed by: FAMILY MEDICINE

## 2023-07-28 PROCEDURE — 1123F ACP DISCUSS/DSCN MKR DOCD: CPT | Performed by: FAMILY MEDICINE

## 2023-07-28 PROCEDURE — 3075F SYST BP GE 130 - 139MM HG: CPT | Performed by: FAMILY MEDICINE

## 2023-07-28 NOTE — PROGRESS NOTES
Chief Complaint   Patient presents with    Hypertension    Cholesterol Problem         1. Patient in office today for follow up and fasting labs. Have no concerns. Have not had to est care with a specialist since lov. 1. Have you been to the ER, urgent care clinic since your last visit? Hospitalized since your last visit? No    2. Have you seen or consulted any other health care providers outside of the [address] [place] [address] Avenue since your last visit? Include any pap smears or colon screening.  No

## 2023-07-29 LAB
ALBUMIN SERPL-MCNC: 4.7 G/DL (ref 3.9–4.9)
ALBUMIN/GLOB SERPL: 1.5 {RATIO} (ref 1.2–2.2)
ALP SERPL-CCNC: 77 IU/L (ref 44–121)
ALT SERPL-CCNC: 16 IU/L (ref 0–44)
AST SERPL-CCNC: 18 IU/L (ref 0–40)
BILIRUB SERPL-MCNC: 0.4 MG/DL (ref 0–1.2)
BUN SERPL-MCNC: 13 MG/DL (ref 8–27)
BUN/CREAT SERPL: 12 (ref 10–24)
CALCIUM SERPL-MCNC: 9.8 MG/DL (ref 8.6–10.2)
CHLORIDE SERPL-SCNC: 102 MMOL/L (ref 96–106)
CHOLEST SERPL-MCNC: 128 MG/DL (ref 100–199)
CO2 SERPL-SCNC: 19 MMOL/L (ref 20–29)
CREAT SERPL-MCNC: 1.07 MG/DL (ref 0.76–1.27)
EGFRCR SERPLBLD CKD-EPI 2021: 75 ML/MIN/1.73
GLOBULIN SER CALC-MCNC: 3.2 G/DL (ref 1.5–4.5)
GLUCOSE SERPL-MCNC: 107 MG/DL (ref 70–99)
HBA1C MFR BLD: 6 % (ref 4.8–5.6)
HDLC SERPL-MCNC: 32 MG/DL
IMP & REVIEW OF LAB RESULTS: NORMAL
LDLC SERPL CALC-MCNC: 63 MG/DL (ref 0–99)
POTASSIUM SERPL-SCNC: 4.6 MMOL/L (ref 3.5–5.2)
PROT SERPL-MCNC: 7.9 G/DL (ref 6–8.5)
SODIUM SERPL-SCNC: 139 MMOL/L (ref 134–144)
SPECIMEN STATUS REPORT: NORMAL
TRIGL SERPL-MCNC: 198 MG/DL (ref 0–149)
VLDLC SERPL CALC-MCNC: 33 MG/DL (ref 5–40)

## 2023-09-08 LAB — NONINV COLON CA DNA+OCC BLD SCRN STL QL: NEGATIVE

## 2023-10-12 DIAGNOSIS — I10 ESSENTIAL (PRIMARY) HYPERTENSION: ICD-10-CM

## 2023-10-12 DIAGNOSIS — E78.2 MIXED HYPERLIPIDEMIA: ICD-10-CM

## 2023-10-13 RX ORDER — LISINOPRIL 20 MG/1
20 TABLET ORAL DAILY
Qty: 90 TABLET | Refills: 3 | Status: SHIPPED | OUTPATIENT
Start: 2023-10-13

## 2023-10-13 RX ORDER — ATORVASTATIN CALCIUM 10 MG/1
10 TABLET, FILM COATED ORAL DAILY
Qty: 90 TABLET | Refills: 3 | Status: SHIPPED | OUTPATIENT
Start: 2023-10-13

## 2024-01-12 DIAGNOSIS — I10 ESSENTIAL (PRIMARY) HYPERTENSION: ICD-10-CM

## 2024-01-12 RX ORDER — SPIRONOLACTONE 50 MG/1
50 TABLET, FILM COATED ORAL DAILY
Qty: 90 TABLET | Refills: 3 | Status: SHIPPED | OUTPATIENT
Start: 2024-01-12

## 2024-02-01 DIAGNOSIS — I10 ESSENTIAL (PRIMARY) HYPERTENSION: ICD-10-CM

## 2024-02-01 RX ORDER — AMLODIPINE BESYLATE 10 MG/1
TABLET ORAL
Qty: 90 TABLET | Refills: 1 | Status: SHIPPED | OUTPATIENT
Start: 2024-02-01

## 2024-05-17 ENCOUNTER — OFFICE VISIT (OUTPATIENT)
Age: 69
End: 2024-05-17
Payer: MEDICARE

## 2024-05-17 VITALS
WEIGHT: 200 LBS | BODY MASS INDEX: 30.31 KG/M2 | DIASTOLIC BLOOD PRESSURE: 87 MMHG | HEART RATE: 90 BPM | TEMPERATURE: 98 F | SYSTOLIC BLOOD PRESSURE: 120 MMHG | HEIGHT: 68 IN | OXYGEN SATURATION: 97 %

## 2024-05-17 DIAGNOSIS — E78.2 MIXED HYPERLIPIDEMIA: ICD-10-CM

## 2024-05-17 DIAGNOSIS — Z12.5 PROSTATE CANCER SCREENING: ICD-10-CM

## 2024-05-17 DIAGNOSIS — I10 PRIMARY HYPERTENSION: ICD-10-CM

## 2024-05-17 DIAGNOSIS — Z00.00 MEDICARE ANNUAL WELLNESS VISIT, SUBSEQUENT: Primary | ICD-10-CM

## 2024-05-17 DIAGNOSIS — R73.03 PREDIABETES: ICD-10-CM

## 2024-05-17 PROCEDURE — 99214 OFFICE O/P EST MOD 30 MIN: CPT | Performed by: FAMILY MEDICINE

## 2024-05-17 PROCEDURE — 3079F DIAST BP 80-89 MM HG: CPT | Performed by: FAMILY MEDICINE

## 2024-05-17 PROCEDURE — G0439 PPPS, SUBSEQ VISIT: HCPCS | Performed by: FAMILY MEDICINE

## 2024-05-17 PROCEDURE — 1123F ACP DISCUSS/DSCN MKR DOCD: CPT | Performed by: FAMILY MEDICINE

## 2024-05-17 PROCEDURE — 3074F SYST BP LT 130 MM HG: CPT | Performed by: FAMILY MEDICINE

## 2024-05-17 RX ORDER — SPIRONOLACTONE 50 MG/1
50 TABLET, FILM COATED ORAL DAILY
Qty: 90 TABLET | Refills: 3 | Status: SHIPPED | OUTPATIENT
Start: 2024-05-17

## 2024-05-17 RX ORDER — AMLODIPINE BESYLATE 10 MG/1
10 TABLET ORAL DAILY
Qty: 90 TABLET | Refills: 3 | Status: SHIPPED | OUTPATIENT
Start: 2024-05-17

## 2024-05-17 RX ORDER — ATORVASTATIN CALCIUM 10 MG/1
10 TABLET, FILM COATED ORAL DAILY
Qty: 90 TABLET | Refills: 3 | Status: SHIPPED | OUTPATIENT
Start: 2024-05-17

## 2024-05-17 RX ORDER — LISINOPRIL 20 MG/1
20 TABLET ORAL DAILY
Qty: 90 TABLET | Refills: 3 | Status: SHIPPED | OUTPATIENT
Start: 2024-05-17

## 2024-05-17 SDOH — HEALTH STABILITY: PHYSICAL HEALTH: ON AVERAGE, HOW MANY DAYS PER WEEK DO YOU ENGAGE IN MODERATE TO STRENUOUS EXERCISE (LIKE A BRISK WALK)?: 3 DAYS

## 2024-05-17 SDOH — HEALTH STABILITY: PHYSICAL HEALTH: ON AVERAGE, HOW MANY MINUTES DO YOU ENGAGE IN EXERCISE AT THIS LEVEL?: 60 MIN

## 2024-05-17 ASSESSMENT — PATIENT HEALTH QUESTIONNAIRE - PHQ9
2. FEELING DOWN, DEPRESSED OR HOPELESS: NOT AT ALL
SUM OF ALL RESPONSES TO PHQ QUESTIONS 1-9: 0
SUM OF ALL RESPONSES TO PHQ9 QUESTIONS 1 & 2: 0
SUM OF ALL RESPONSES TO PHQ QUESTIONS 1-9: 0
1. LITTLE INTEREST OR PLEASURE IN DOING THINGS: NOT AT ALL
SUM OF ALL RESPONSES TO PHQ QUESTIONS 1-9: 0
SUM OF ALL RESPONSES TO PHQ QUESTIONS 1-9: 0

## 2024-05-17 ASSESSMENT — LIFESTYLE VARIABLES
HOW OFTEN DO YOU HAVE SIX OR MORE DRINKS ON ONE OCCASION: 1
HOW OFTEN DO YOU HAVE A DRINK CONTAINING ALCOHOL: 4
HOW MANY STANDARD DRINKS CONTAINING ALCOHOL DO YOU HAVE ON A TYPICAL DAY: 1 OR 2
HOW MANY STANDARD DRINKS CONTAINING ALCOHOL DO YOU HAVE ON A TYPICAL DAY: 1
HOW OFTEN DO YOU HAVE A DRINK CONTAINING ALCOHOL: 2-3 TIMES A WEEK

## 2024-05-17 NOTE — PROGRESS NOTES
Medicare Annual Wellness Visit    Darwin Pascual is here for Medicare AWV    Assessment & Plan   Medicare annual wellness visit, subsequent  Primary hypertension  -     Comprehensive Metabolic Panel; Future  -     amLODIPine (NORVASC) 10 MG tablet; Take 1 tablet by mouth daily, Disp-90 tablet, R-3Normal  -     lisinopril (PRINIVIL;ZESTRIL) 20 MG tablet; Take 1 tablet by mouth daily, Disp-90 tablet, R-3Normal  -     spironolactone (ALDACTONE) 50 MG tablet; Take 1 tablet by mouth daily, Disp-90 tablet, R-3Normal  Start monitoring at home.  Discussed goal is less than 130 over less than 80.  Prediabetes  -     Hemoglobin A1C; Future  Mixed hyperlipidemia  -     Comprehensive Metabolic Panel; Future  -     Lipid Panel; Future  -     atorvastatin (LIPITOR) 10 MG tablet; Take 1 tablet by mouth daily, Disp-90 tablet, R-3Normal  Prostate cancer screening  -     PSA Screening; Future    Recommendations for Preventive Services Due: see orders and patient instructions/AVS.  Recommended screening schedule for the next 5-10 years is provided to the patient in written form: see Patient Instructions/AVS.     Return in 6 months (on 11/17/2024).     Subjective   The following acute and/or chronic problems were also addressed today:  Patient with prediabetes he started being more active since he got warmer out.  He states he needs a protein vegetable starch with each meal.  History of prediabetes.  Previous hemoglobin A1c is 6.  Hypertension is doing better overall on medication.  Discussed would like to see the diastolic number little lower recommend him checking at home  LDL at goal but HDL low.  Discussed increased exercise, healthy fats.    Lab Results   Component Value Date    CHOL 128 07/28/2023    TRIG 198 (H) 07/28/2023    HDL 32 (L) 07/28/2023    LDL 63 07/28/2023    VLDL 33 07/28/2023    CHOLHDLRATIO 4.0 01/22/2020         Patient's complete Health Risk Assessment and screening values have been reviewed and are found in

## 2024-05-17 NOTE — PROGRESS NOTES
Darwin Pascual is a 68 y.o. male , id x 2(name and ). Reviewed record, history, and  medications.    Chief Complaint   Patient presents with    Medicare AWV       Vitals:    24 1359   BP: 120/87   Pulse: 90   Temp: 98 °F (36.7 °C)   SpO2: 97%   Weight: 90.7 kg (200 lb)   Height: 1.727 m (5' 8\")       When was your last eye exam?  N/a     Coordination of Care Questionnaire:   1. Have you been to the ER, urgent care clinic since your last visit?  Hospitalized since your last visit?No    2. Have you seen or consulted any other health care providers outside of the [facility] since your last visit?  Include any pap smears or colon screening. No          2024    11:52 AM   PHQ-9    Little interest or pleasure in doing things 0   Feeling down, depressed, or hopeless 0   PHQ-2 Score 0   PHQ-9 Total Score 0       Social Determinants of Health     Tobacco Use: High Risk (2024)    Patient History     Smoking Tobacco Use: Every Day     Smokeless Tobacco Use: Never     Passive Exposure: Not on file   Alcohol Use: Not At Risk (2024)    AUDIT-C     Frequency of Alcohol Consumption: 2-3 times a week     Average Number of Drinks: 1 or 2     Frequency of Binge Drinking: Never   Financial Resource Strain: Low Risk  (2023)    Overall Financial Resource Strain (CARDIA)     Difficulty of Paying Living Expenses: Not very hard   Food Insecurity: Not on file (2023)   Transportation Needs: Unknown (2023)    PRAPARE - Transportation     Lack of Transportation (Medical): Not on file     Lack of Transportation (Non-Medical): No   Physical Activity: Sufficiently Active (2024)    Exercise Vital Sign     Days of Exercise per Week: 3 days     Minutes of Exercise per Session: 60 min   Stress: Not on file   Social Connections: Not on file   Intimate Partner Violence: Not on file   Depression: Not at risk (2024)    PHQ-2     PHQ-2 Score: 0   Housing Stability: Unknown (2023)

## 2024-05-18 LAB
ALBUMIN SERPL-MCNC: 4.4 G/DL (ref 3.9–4.9)
ALBUMIN/GLOB SERPL: 1.5 {RATIO} (ref 1.2–2.2)
ALP SERPL-CCNC: 72 IU/L (ref 44–121)
ALT SERPL-CCNC: 15 IU/L (ref 0–44)
AST SERPL-CCNC: 19 IU/L (ref 0–40)
BILIRUB SERPL-MCNC: 0.4 MG/DL (ref 0–1.2)
BUN SERPL-MCNC: 17 MG/DL (ref 8–27)
BUN/CREAT SERPL: 12 (ref 10–24)
CALCIUM SERPL-MCNC: 9.7 MG/DL (ref 8.6–10.2)
CHLORIDE SERPL-SCNC: 102 MMOL/L (ref 96–106)
CHOLEST SERPL-MCNC: 136 MG/DL (ref 100–199)
CO2 SERPL-SCNC: 19 MMOL/L (ref 20–29)
CREAT SERPL-MCNC: 1.39 MG/DL (ref 0.76–1.27)
EGFRCR SERPLBLD CKD-EPI 2021: 55 ML/MIN/1.73
GLOBULIN SER CALC-MCNC: 2.9 G/DL (ref 1.5–4.5)
GLUCOSE SERPL-MCNC: 87 MG/DL (ref 70–99)
HBA1C MFR BLD: 6 % (ref 4.8–5.6)
HDLC SERPL-MCNC: 33 MG/DL
IMP & REVIEW OF LAB RESULTS: NORMAL
LDLC SERPL CALC-MCNC: 74 MG/DL (ref 0–99)
POTASSIUM SERPL-SCNC: 4.4 MMOL/L (ref 3.5–5.2)
PROT SERPL-MCNC: 7.3 G/DL (ref 6–8.5)
PSA SERPL-MCNC: 0.6 NG/ML (ref 0–4)
REPORT: NORMAL
REPORT: NORMAL
SODIUM SERPL-SCNC: 137 MMOL/L (ref 134–144)
SPECIMEN STATUS REPORT: NORMAL
TRIGL SERPL-MCNC: 166 MG/DL (ref 0–149)
VLDLC SERPL CALC-MCNC: 29 MG/DL (ref 5–40)

## 2024-05-20 DIAGNOSIS — R79.89 ELEVATED SERUM CREATININE: Primary | ICD-10-CM

## 2024-06-17 ENCOUNTER — APPOINTMENT (OUTPATIENT)
Facility: HOSPITAL | Age: 69
End: 2024-06-17
Payer: MEDICARE

## 2024-06-17 ENCOUNTER — HOSPITAL ENCOUNTER (EMERGENCY)
Facility: HOSPITAL | Age: 69
Discharge: HOME | End: 2024-06-17
Attending: STUDENT IN AN ORGANIZED HEALTH CARE EDUCATION/TRAINING PROGRAM
Payer: MEDICARE

## 2024-06-17 VITALS
HEIGHT: 69 IN | BODY MASS INDEX: 28.14 KG/M2 | SYSTOLIC BLOOD PRESSURE: 123 MMHG | RESPIRATION RATE: 18 BRPM | DIASTOLIC BLOOD PRESSURE: 80 MMHG | OXYGEN SATURATION: 100 % | TEMPERATURE: 97.5 F | HEART RATE: 74 BPM | WEIGHT: 190 LBS

## 2024-06-17 DIAGNOSIS — R07.82 INTERCOSTAL PAIN: Primary | ICD-10-CM

## 2024-06-17 LAB
ALBUMIN SERPL-MCNC: 3.9 G/DL (ref 3.5–5)
ALBUMIN/GLOB SERPL: 0.9 (ref 1.1–2.2)
ALP SERPL-CCNC: 83 U/L (ref 45–117)
ALT SERPL-CCNC: 20 U/L (ref 12–78)
ANION GAP SERPL CALC-SCNC: 5 MMOL/L (ref 5–15)
AST SERPL-CCNC: 17 U/L (ref 15–37)
BASOPHILS # BLD: 0 K/UL (ref 0–0.1)
BASOPHILS NFR BLD: 1 % (ref 0–1)
BILIRUB SERPL-MCNC: 0.7 MG/DL (ref 0.2–1)
BUN SERPL-MCNC: 26 MG/DL (ref 6–20)
BUN/CREAT SERPL: 20 (ref 12–20)
CALCIUM SERPL-MCNC: 9.6 MG/DL (ref 8.5–10.1)
CHLORIDE SERPL-SCNC: 102 MMOL/L (ref 97–108)
CO2 SERPL-SCNC: 25 MMOL/L (ref 21–32)
COMMENT:: NORMAL
CREAT SERPL-MCNC: 1.27 MG/DL (ref 0.7–1.3)
DIFFERENTIAL METHOD BLD: ABNORMAL
EOSINOPHIL # BLD: 0.1 K/UL (ref 0–0.4)
EOSINOPHIL NFR BLD: 2 % (ref 0–7)
ERYTHROCYTE [DISTWIDTH] IN BLOOD BY AUTOMATED COUNT: 12 % (ref 11.5–14.5)
GLOBULIN SER CALC-MCNC: 4.3 G/DL (ref 2–4)
GLUCOSE SERPL-MCNC: 131 MG/DL (ref 65–100)
HCT VFR BLD AUTO: 41.5 % (ref 36.6–50.3)
HGB BLD-MCNC: 13.6 G/DL (ref 12.1–17)
IMM GRANULOCYTES # BLD AUTO: 0 K/UL (ref 0–0.04)
IMM GRANULOCYTES NFR BLD AUTO: 1 % (ref 0–0.5)
LIPASE SERPL-CCNC: 38 U/L (ref 13–75)
LYMPHOCYTES # BLD: 1.4 K/UL (ref 0.8–3.5)
LYMPHOCYTES NFR BLD: 17 % (ref 12–49)
MCH RBC QN AUTO: 30.3 PG (ref 26–34)
MCHC RBC AUTO-ENTMCNC: 32.8 G/DL (ref 30–36.5)
MCV RBC AUTO: 92.4 FL (ref 80–99)
MONOCYTES # BLD: 0.5 K/UL (ref 0–1)
MONOCYTES NFR BLD: 6 % (ref 5–13)
NEUTS SEG # BLD: 6.3 K/UL (ref 1.8–8)
NEUTS SEG NFR BLD: 73 % (ref 32–75)
NRBC # BLD: 0 K/UL (ref 0–0.01)
NRBC BLD-RTO: 0 PER 100 WBC
PLATELET # BLD AUTO: 324 K/UL (ref 150–400)
PMV BLD AUTO: 10 FL (ref 8.9–12.9)
POTASSIUM SERPL-SCNC: 4.6 MMOL/L (ref 3.5–5.1)
PROT SERPL-MCNC: 8.2 G/DL (ref 6.4–8.2)
RBC # BLD AUTO: 4.49 M/UL (ref 4.1–5.7)
SODIUM SERPL-SCNC: 132 MMOL/L (ref 136–145)
SPECIMEN HOLD: NORMAL
TROPONIN I SERPL HS-MCNC: 5 NG/L (ref 0–76)
WBC # BLD AUTO: 8.5 K/UL (ref 4.1–11.1)

## 2024-06-17 PROCEDURE — 71275 CT ANGIOGRAPHY CHEST: CPT

## 2024-06-17 PROCEDURE — 36415 COLL VENOUS BLD VENIPUNCTURE: CPT

## 2024-06-17 PROCEDURE — 83690 ASSAY OF LIPASE: CPT

## 2024-06-17 PROCEDURE — 99285 EMERGENCY DEPT VISIT HI MDM: CPT

## 2024-06-17 PROCEDURE — 80053 COMPREHEN METABOLIC PANEL: CPT

## 2024-06-17 PROCEDURE — 85025 COMPLETE CBC W/AUTO DIFF WBC: CPT

## 2024-06-17 PROCEDURE — 84484 ASSAY OF TROPONIN QUANT: CPT

## 2024-06-17 PROCEDURE — 93005 ELECTROCARDIOGRAM TRACING: CPT | Performed by: STUDENT IN AN ORGANIZED HEALTH CARE EDUCATION/TRAINING PROGRAM

## 2024-06-17 PROCEDURE — 6360000004 HC RX CONTRAST MEDICATION: Performed by: STUDENT IN AN ORGANIZED HEALTH CARE EDUCATION/TRAINING PROGRAM

## 2024-06-17 RX ORDER — PREDNISONE 20 MG/1
40 TABLET ORAL DAILY
Qty: 10 TABLET | Refills: 0 | Status: SHIPPED | OUTPATIENT
Start: 2024-06-17 | End: 2024-06-22

## 2024-06-17 RX ORDER — LIDOCAINE 50 MG/G
1 PATCH TOPICAL DAILY
Qty: 10 PATCH | Refills: 0 | Status: SHIPPED | OUTPATIENT
Start: 2024-06-17 | End: 2024-06-27

## 2024-06-17 RX ORDER — HYDROCODONE BITARTRATE AND ACETAMINOPHEN 5; 325 MG/1; MG/1
1 TABLET ORAL EVERY 8 HOURS PRN
Qty: 6 TABLET | Refills: 0 | Status: SHIPPED | OUTPATIENT
Start: 2024-06-17 | End: 2024-06-20

## 2024-06-17 RX ADMIN — IOPAMIDOL 100 ML: 755 INJECTION, SOLUTION INTRAVENOUS at 14:46

## 2024-06-17 ASSESSMENT — ENCOUNTER SYMPTOMS
ABDOMINAL PAIN: 0
CONSTIPATION: 1
COUGH: 0
EYE DISCHARGE: 0
EYE REDNESS: 0
SHORTNESS OF BREATH: 0
NAUSEA: 1
RHINORRHEA: 0
DIARRHEA: 0
VOMITING: 0

## 2024-06-17 ASSESSMENT — PAIN DESCRIPTION - ORIENTATION: ORIENTATION: LEFT

## 2024-06-17 ASSESSMENT — PAIN - FUNCTIONAL ASSESSMENT: PAIN_FUNCTIONAL_ASSESSMENT: 0-10

## 2024-06-17 ASSESSMENT — PAIN DESCRIPTION - DESCRIPTORS: DESCRIPTORS: JABBING;SHOOTING;SHARP

## 2024-06-17 ASSESSMENT — PAIN DESCRIPTION - LOCATION: LOCATION: RIB CAGE

## 2024-06-17 NOTE — ED TRIAGE NOTES
Reports severe pain in his left ribs / chest area for two weeks.  No known injury.    [facility] this issue.

## 2024-06-17 NOTE — ED PROVIDER NOTES
follow-up with PCP and given ER return precautions.  Patient and/or family verbally conveyed their understanding and agreement of the patient's signs, symptoms, diagnosis, treatment and prognosis and additionally agree to follow-up as recommended in the discharge instructions or to return to the Emergency Department should their condition change or worsen prior to their follow-up appointment.  All questions have been answered and patient and/or available family express understanding.        PROCEDURES:  Unless otherwise noted below, none     Procedures      FINAL IMPRESSION      1. Intercostal pain          DISPOSITION/PLAN   DISPOSITION Decision To Discharge 06/17/2024 04:41:49 PM      PATIENT REFERRED TO:  Eli Rivas DO  [id number] [address] [id number] [phone]    Schedule an appointment as soon as possible for a visit       [facility] EMERGENCY DEPT  [address] [place] [address]  [phone]    If symptoms worsen      DISCHARGE MEDICATIONS:  Discharge Medication List as of 6/17/2024  4:43 PM        START taking these medications    Details   predniSONE (DELTASONE) 20 MG tablet Take 2 tablets by mouth daily for 5 days, Disp-10 tablet, R-0Normal      lidocaine (LIDODERM) 5 % Place 1 patch onto the skin daily for 10 days 12 hours on, 12 hours off., Disp-10 patch, R-0Normal      HYDROcodone-acetaminophen (NORCO) 5-325 MG per tablet Take 1 tablet by mouth every 8 hours as needed for Pain for up to 3 days. Intended supply: 3 days. Take lowest dose possible to manage pain Max Daily Amount: 3 tablets, Disp-6 tablet, R-0Normal               (Please note that portions of this note were completed with a voice recognition program.  Efforts were made to edit the dictations but occasionally words are mis-transcribed.)    Gisselle Mullins DO (electronically signed)  Emergency Attending Physician / Physician Assistant / Nurse Practitioner         Gisselle Mullins DO  06/18/24

## 2024-06-18 LAB
EKG ATRIAL RATE: 67 BPM
EKG DIAGNOSIS: NORMAL
EKG P AXIS: 62 DEGREES
EKG P-R INTERVAL: 196 MS
EKG Q-T INTERVAL: 374 MS
EKG QRS DURATION: 84 MS
EKG QTC CALCULATION (BAZETT): 395 MS
EKG R AXIS: 77 DEGREES
EKG T AXIS: 35 DEGREES
EKG VENTRICULAR RATE: 67 BPM

## 2024-06-18 PROCEDURE — 93010 ELECTROCARDIOGRAM REPORT: CPT | Performed by: INTERNAL MEDICINE
